# Patient Record
Sex: FEMALE | Race: OTHER | NOT HISPANIC OR LATINO
[De-identification: names, ages, dates, MRNs, and addresses within clinical notes are randomized per-mention and may not be internally consistent; named-entity substitution may affect disease eponyms.]

---

## 2019-05-31 ENCOUNTER — APPOINTMENT (OUTPATIENT)
Dept: PEDIATRIC ALLERGY IMMUNOLOGY | Facility: CLINIC | Age: 54
End: 2019-05-31

## 2019-07-31 ENCOUNTER — APPOINTMENT (OUTPATIENT)
Dept: PEDIATRIC ALLERGY IMMUNOLOGY | Facility: CLINIC | Age: 54
End: 2019-07-31
Payer: MEDICAID

## 2019-07-31 VITALS
DIASTOLIC BLOOD PRESSURE: 69 MMHG | SYSTOLIC BLOOD PRESSURE: 113 MMHG | OXYGEN SATURATION: 95 % | BODY MASS INDEX: 22.47 KG/M2 | HEART RATE: 105 BPM | HEIGHT: 63.9 IN | WEIGHT: 129.98 LBS

## 2019-07-31 DIAGNOSIS — L30.9 DERMATITIS, UNSPECIFIED: ICD-10-CM

## 2019-07-31 DIAGNOSIS — Z84.0 FAMILY HISTORY OF DISEASES OF THE SKIN AND SUBCUTANEOUS TISSUE: ICD-10-CM

## 2019-07-31 DIAGNOSIS — L29.9 PRURITUS, UNSPECIFIED: ICD-10-CM

## 2019-07-31 PROCEDURE — 99204 OFFICE O/P NEW MOD 45 MIN: CPT | Mod: 25

## 2019-07-31 PROCEDURE — 95004 PERQ TESTS W/ALRGNC XTRCS: CPT

## 2019-07-31 RX ORDER — TRIAMCINOLONE ACETONIDE 5 MG/G
0.5 OINTMENT TOPICAL 3 TIMES DAILY
Qty: 1 | Refills: 1 | Status: ACTIVE | COMMUNITY
Start: 2019-07-31 | End: 1900-01-01

## 2019-07-31 RX ORDER — CETIRIZINE HYDROCHLORIDE 10 MG/1
10 TABLET, FILM COATED ORAL
Qty: 30 | Refills: 1 | Status: ACTIVE | COMMUNITY
Start: 2019-07-31 | End: 1900-01-01

## 2019-07-31 NOTE — REASON FOR VISIT
[Red Eyes] : red eyes [Itchy Eyes] : itchy eyes [Initial Consultation] : an initial consultation for [Rash] : rash [Runny Nose] : runny nose [Congestion] : congestion

## 2019-07-31 NOTE — IMPRESSION
[Allergy Testing Dust Mite] : dust mites [________] : [unfilled] [Allergy Testing Mixed Feathers] : feathers [Allergy Testing Cockroach] : cockroach [Allergy Testing Dog] : dog [Allergy Testing Cat] : cat [] : molds [Allergy Testing Weeds] : weeds [Allergy Testing Grasses] : grasses

## 2019-08-03 PROBLEM — Z84.0 FAMILY HISTORY OF ATOPIC DERMATITIS: Status: ACTIVE | Noted: 2019-08-03

## 2019-08-03 PROBLEM — L30.9 ECZEMA: Status: ACTIVE | Noted: 2019-07-31

## 2019-08-03 PROBLEM — L29.9 PRURITUS: Status: ACTIVE | Noted: 2019-08-03

## 2019-08-03 NOTE — SOCIAL HISTORY
[Spouse/Partner] : spouse/partner [House] : [unfilled] lives in a house  [Window Units] : air conditioning provided by window units [Damp/Musty] : damp/musty [] :  [None] : none [de-identified] : son and daughter [Bedroom] : not in the bedroom [Living Area] : not in the living area [Basement] : not in the basement [Smokers in Household] : there are no smokers in the home

## 2019-08-03 NOTE — CONSULT LETTER
[Dear  ___] : Dear  [unfilled], [Please see my note below.] : Please see my note below. [Consult Letter:] : I had the pleasure of evaluating your patient, [unfilled]. [Sincerely,] : Sincerely, [Consult Closing:] : Thank you very much for allowing me to participate in the care of this patient.  If you have any questions, please do not hesitate to contact me. [FreeTextEntry2] : Jimbo Hartmann MD [FreeTextEntry3] : Tita Anderson MD, FAAAAI, FACJEREMII\par Associate , \par Assistant Fellowship Training ,\par Director, [facility]\par Division of Allergy and Immunology\par [facility]\par [facility]\par , Pediatrics and Medicine\par [facility]\par [address], [address]\[place] [address]\par [phone]\par

## 2019-08-03 NOTE — PHYSICAL EXAM
[Alert] : alert [Well Nourished] : well nourished [No Acute Distress] : no acute distress [Healthy Appearance] : healthy appearance [Normal Pupil & Iris Size/Symmetry] : normal pupil and iris size and symmetry [Well Developed] : well developed [Sclera Not Icteric] : sclera not icteric [No Photophobia] : no photophobia [No Discharge] : no discharge [Normal TMs] : both tympanic membranes were normal [Normal Lips/Tongue] : the lips and tongue were normal [Normal Outer Ear/Nose] : the ears and nose were normal in appearance [No Thrush] : no thrush [Normal Tonsils] : normal tonsils [Supple] : the neck was supple [Normal Rate and Effort] : normal respiratory rhythm and effort [No Crackles] : no crackles [No Retractions] : no retractions [Normal Rate] : heart rate was normal  [Normal S1, S2] : normal S1 and S2 [Bilateral Audible Breath Sounds] : bilateral audible breath sounds [Regular Rhythm] : with a regular rhythm [No murmur] : no murmur [Soft] : abdomen soft [Normal Cervical Lymph Nodes] : cervical [Not Distended] : not distended [No clubbing] : no clubbing [Eczematous Patches] : eczematous patches present [No Edema] : no edema [No Cyanosis] : no cyanosis [Normal Mood] : mood was normal [Normal Affect] : affect was normal [Alert, Awake, Oriented as Age-Appropriate] : alert, awake, oriented as age appropriate [Conjunctival Erythema] : no conjunctival erythema [Boggy Nasal Turbinates] : boggy and/or pale nasal turbinates [Exudate] : no exudate [Pharyngeal erythema] : no pharyngeal erythema [Posterior Pharyngeal Cobblestoning] : no posterior pharyngeal cobblestoning [Clear Rhinorrhea] : no clear rhinorrhea was seen [de-identified] : dry skin with abrasions noted on b/l antecubital fossa and back of the neck; eczematous patches on antecubital fossae bilaterally; back of neck and generalized dry skin

## 2019-08-03 NOTE — ASSESSMENT
[FreeTextEntry1] : 53 year old female with \par \par 1) ECZEMA/ATOPIC DERMATITIS: Triamcinolone ointment 2-3 times a day to affected areas. Zyrtec QD at bedtime for itching. Discussed the importance of moisturization, use of unscented products, and appropriate bathing methods.\par taking short lukewarm showers. If no improvement in 1 month.\par \par PossIble CONTACT DERMATITIS: patient should return to get patch testing performed on a Monday.\par \par 2) DUST MITE AND POLLEN ALLERGY- Allergic Rhinitis (environmental allergies to dustmites and birch): Discussed instructions for allergen avoidance. Can use flonase daily in spring if needed, in addition to avoidance of triggers.

## 2019-08-03 NOTE — HISTORY OF PRESENT ILLNESS
[Asthma] : asthma [Venom Reactions] : venom reactions [Food Allergies] : food allergies [de-identified] : 53 year old Female with no PMH who is here for initial consultation for rashes x 6 months. Patient states the rashes started as itchy bumps, once she started to scratch them, they developed into open lesions or abrasions. She has tried to put lotion on it (Kristi), which did not help. PCP gave some cream, thinks it is hydrocortisone 0.05% but unclear; states it did not help. She used it for 1 month. Ms. Mooney had menopause 5 years ago. On ROS, intermittent itchy eyes, runny nose, congestion but much improved from earlier years.

## 2019-08-03 NOTE — REVIEW OF SYSTEMS
[Pruritis] : pruritis [Dry Skin] : ~L dry skin [Nl] : Endocrine [Eye Itching] : itchy eyes [Rhinorrhea] : rhinorrhea [Nasal Congestion] : nasal congestion [Puffy Eyelids] : no puffy ~T eyelids [Nosebleeds] : no epistaxis [Bloodshot Eyes] : no bloodshot ~T eyes [Snoring] : no snoring [Nasal Itching] : no nasal itching [Mouth Sores] : no mouth sores [Sore Throat] : no sore throat [Throat Itching] : no throat itching [Post Nasal Drip] : no post nasal drip [Sneezing] : no sneezing [Urticaria] : no urticaria [Swelling] : no swelling [de-identified] : rash

## 2019-10-28 ENCOUNTER — EMERGENCY (EMERGENCY)
Facility: HOSPITAL | Age: 54
LOS: 1 days | Discharge: HOME | End: 2019-10-28
Attending: EMERGENCY MEDICINE | Admitting: EMERGENCY MEDICINE
Payer: MEDICAID

## 2019-10-28 VITALS
DIASTOLIC BLOOD PRESSURE: 82 MMHG | SYSTOLIC BLOOD PRESSURE: 143 MMHG | OXYGEN SATURATION: 96 % | HEART RATE: 130 BPM | TEMPERATURE: 98 F | RESPIRATION RATE: 25 BRPM

## 2019-10-28 LAB
ALBUMIN SERPL ELPH-MCNC: 4.3 G/DL — SIGNIFICANT CHANGE UP (ref 3.3–5)
ALP SERPL-CCNC: 79 U/L — SIGNIFICANT CHANGE UP (ref 40–120)
ALT FLD-CCNC: 17 U/L — SIGNIFICANT CHANGE UP (ref 4–33)
ANION GAP SERPL CALC-SCNC: 12 MMO/L — SIGNIFICANT CHANGE UP (ref 7–14)
APTT BLD: 34.2 SEC — SIGNIFICANT CHANGE UP (ref 27.5–36.3)
AST SERPL-CCNC: 21 U/L — SIGNIFICANT CHANGE UP (ref 4–32)
BASE EXCESS BLDV CALC-SCNC: 1.3 MMOL/L — SIGNIFICANT CHANGE UP
BASOPHILS # BLD AUTO: 0.1 K/UL — SIGNIFICANT CHANGE UP (ref 0–0.2)
BASOPHILS NFR BLD AUTO: 0.8 % — SIGNIFICANT CHANGE UP (ref 0–2)
BILIRUB SERPL-MCNC: 0.3 MG/DL — SIGNIFICANT CHANGE UP (ref 0.2–1.2)
BLOOD GAS VENOUS - CREATININE: 0.82 MG/DL — SIGNIFICANT CHANGE UP (ref 0.5–1.3)
BUN SERPL-MCNC: 14 MG/DL — SIGNIFICANT CHANGE UP (ref 7–23)
CALCIUM SERPL-MCNC: 9.6 MG/DL — SIGNIFICANT CHANGE UP (ref 8.4–10.5)
CHLORIDE BLDV-SCNC: 105 MMOL/L — SIGNIFICANT CHANGE UP (ref 96–108)
CHLORIDE SERPL-SCNC: 103 MMOL/L — SIGNIFICANT CHANGE UP (ref 98–107)
CO2 SERPL-SCNC: 25 MMOL/L — SIGNIFICANT CHANGE UP (ref 22–31)
CREAT SERPL-MCNC: 0.86 MG/DL — SIGNIFICANT CHANGE UP (ref 0.5–1.3)
EOSINOPHIL # BLD AUTO: 0.98 K/UL — HIGH (ref 0–0.5)
EOSINOPHIL NFR BLD AUTO: 8.2 % — HIGH (ref 0–6)
GAS PNL BLDV: 141 MMOL/L — SIGNIFICANT CHANGE UP (ref 136–146)
GLUCOSE BLDV-MCNC: 93 MG/DL — SIGNIFICANT CHANGE UP (ref 70–99)
GLUCOSE SERPL-MCNC: 95 MG/DL — SIGNIFICANT CHANGE UP (ref 70–99)
HCO3 BLDV-SCNC: 25 MMOL/L — SIGNIFICANT CHANGE UP (ref 20–27)
HCT VFR BLD CALC: 40.5 % — SIGNIFICANT CHANGE UP (ref 34.5–45)
HCT VFR BLDV CALC: 42.5 % — SIGNIFICANT CHANGE UP (ref 34.5–45)
HGB BLD-MCNC: 13.3 G/DL — SIGNIFICANT CHANGE UP (ref 11.5–15.5)
HGB BLDV-MCNC: 13.8 G/DL — SIGNIFICANT CHANGE UP (ref 11.5–15.5)
IMM GRANULOCYTES NFR BLD AUTO: 0.3 % — SIGNIFICANT CHANGE UP (ref 0–1.5)
INR BLD: 0.99 — SIGNIFICANT CHANGE UP (ref 0.88–1.17)
LACTATE BLDV-MCNC: 1.1 MMOL/L — SIGNIFICANT CHANGE UP (ref 0.5–2)
LYMPHOCYTES # BLD AUTO: 28.1 % — SIGNIFICANT CHANGE UP (ref 13–44)
LYMPHOCYTES # BLD AUTO: 3.36 K/UL — HIGH (ref 1–3.3)
MCHC RBC-ENTMCNC: 25.7 PG — LOW (ref 27–34)
MCHC RBC-ENTMCNC: 32.8 % — SIGNIFICANT CHANGE UP (ref 32–36)
MCV RBC AUTO: 78.2 FL — LOW (ref 80–100)
MONOCYTES # BLD AUTO: 0.63 K/UL — SIGNIFICANT CHANGE UP (ref 0–0.9)
MONOCYTES NFR BLD AUTO: 5.3 % — SIGNIFICANT CHANGE UP (ref 2–14)
NEUTROPHILS # BLD AUTO: 6.84 K/UL — SIGNIFICANT CHANGE UP (ref 1.8–7.4)
NEUTROPHILS NFR BLD AUTO: 57.3 % — SIGNIFICANT CHANGE UP (ref 43–77)
NRBC # FLD: 0 K/UL — SIGNIFICANT CHANGE UP (ref 0–0)
PCO2 BLDV: 44 MMHG — SIGNIFICANT CHANGE UP (ref 41–51)
PH BLDV: 7.39 PH — SIGNIFICANT CHANGE UP (ref 7.32–7.43)
PLATELET # BLD AUTO: 420 K/UL — HIGH (ref 150–400)
PMV BLD: 8.9 FL — SIGNIFICANT CHANGE UP (ref 7–13)
PO2 BLDV: 37 MMHG — SIGNIFICANT CHANGE UP (ref 35–40)
POTASSIUM BLDV-SCNC: 3.7 MMOL/L — SIGNIFICANT CHANGE UP (ref 3.4–4.5)
POTASSIUM SERPL-MCNC: 3.9 MMOL/L — SIGNIFICANT CHANGE UP (ref 3.5–5.3)
POTASSIUM SERPL-SCNC: 3.9 MMOL/L — SIGNIFICANT CHANGE UP (ref 3.5–5.3)
PROT SERPL-MCNC: 8.4 G/DL — HIGH (ref 6–8.3)
PROTHROM AB SERPL-ACNC: 11.3 SEC — SIGNIFICANT CHANGE UP (ref 9.8–13.1)
RBC # BLD: 5.18 M/UL — SIGNIFICANT CHANGE UP (ref 3.8–5.2)
RBC # FLD: 13.8 % — SIGNIFICANT CHANGE UP (ref 10.3–14.5)
SAO2 % BLDV: 64.6 % — SIGNIFICANT CHANGE UP (ref 60–85)
SODIUM SERPL-SCNC: 140 MMOL/L — SIGNIFICANT CHANGE UP (ref 135–145)
TROPONIN T, HIGH SENSITIVITY: < 6 NG/L — SIGNIFICANT CHANGE UP (ref ?–14)
WBC # BLD: 11.94 K/UL — HIGH (ref 3.8–10.5)
WBC # FLD AUTO: 11.94 K/UL — HIGH (ref 3.8–10.5)

## 2019-10-28 PROCEDURE — 99284 EMERGENCY DEPT VISIT MOD MDM: CPT

## 2019-10-28 PROCEDURE — 71046 X-RAY EXAM CHEST 2 VIEWS: CPT | Mod: 26

## 2019-10-28 NOTE — ED ADULT TRIAGE NOTE - AS O2 DELIVERY
room air
Static: mental illness, prior hospitalizations  Modifiable: substance use  Protective factors: able to navigate community and assess help in need of crisis
Static: mental illness, prior hospitalizations  Modifiable: substance use  Protective factors: able to navigate community and assess help in need of crisis

## 2019-10-28 NOTE — ED PROVIDER NOTE - OBJECTIVE STATEMENT
52 yo F PMHx anxiety presenting with shortness of breath and tachycardia. 52 yo F PMHx anxiety presenting with shortness of breath and tachycardia.  Pt endorses SOB over past 2 weeks.  Breathing worse with exertion.  Endorses cough and sore throat.  Denies fever/chills, cp, n/v/d, sick contacts, recent travel.

## 2019-10-28 NOTE — ED ADULT NURSE NOTE - NSIMPLEMENTINTERV_GEN_ALL_ED
Implemented All Universal Safety Interventions:  [employer] to call system. Call bell, personal items and telephone within reach. Instruct patient to call for assistance. Room bathroom lighting operational. Non-slip footwear when patient is off stretcher. Physically safe environment: no spills, clutter or unnecessary equipment. Stretcher in lowest position, wheels locked, appropriate side rails in place.

## 2019-10-28 NOTE — ED ADULT NURSE NOTE - OBJECTIVE STATEMENT
Pt rcvd to rm 7 by Float RN - c/o shortness of breath, dyspnea on exertion x2 wks, worsening today and not alleviated by rest.  Pt states no recent travel, sick contacts, fevers/chills, chest pain, abd pain, n/v/d.  Pt denies significant PMHx, no daily medications, denies hx cardiac problems for her or family.  Pt HR STachy 100s on CM, BP stable, Respirations tachypneic to 20rr on RA, o2sat maintained 100%.  Pt endorses tearing of eyes, environmental allergies, lung sounds clear to auscultation bilaterally w/ mild rhonchi to RLL. Pt noted to have dermatitis raised rash to bilat inner elbows - states has been ongoing for 2mo and has seen outpatient PCP for w/o diagnosis as of yet, but not spreading or worsening in condition.  IV placed to R AC #20g, labs drawn/sent as ordered, RVP specimen drawn/sent, pt and family advised to ISO precautions at this time.  Pt advised plan of care, pending ED MD evaluation and further plan of care, safety measures in place, handoff rpt to primary RN given.

## 2019-10-28 NOTE — ED PROVIDER NOTE - PROGRESS NOTE DETAILS
53F tahycardia.  SOB x 2 weeks some cough sore throat.  CXR clear  if dimer neg cancel cta.  some rales bilat.  if all neg eval for acs.  EKG Stach ST dep infr. Recheck EKG - improved.  trop neg x 2.  Pt still feeling some SOB, has inspiratory crackles and CTA no EP but concern for bronchitis.  Offered CDU, pt agreed.  Plan - fluids, nebs q4-q6.  Echo, cards eval in AM given abnl EKG.  on exam pt with cough, insp crackles, dry MM, diffuse excoriations.

## 2019-10-29 VITALS
RESPIRATION RATE: 18 BRPM | OXYGEN SATURATION: 98 % | DIASTOLIC BLOOD PRESSURE: 68 MMHG | SYSTOLIC BLOOD PRESSURE: 125 MMHG | HEART RATE: 112 BPM

## 2019-10-29 LAB
B PERT DNA SPEC QL NAA+PROBE: NOT DETECTED — SIGNIFICANT CHANGE UP
C PNEUM DNA SPEC QL NAA+PROBE: NOT DETECTED — SIGNIFICANT CHANGE UP
CK MB BLD-MCNC: < 1 NG/ML — LOW (ref 1–4.7)
CK SERPL-CCNC: 58 U/L — SIGNIFICANT CHANGE UP (ref 25–170)
D DIMER BLD IA.RAPID-MCNC: 296 NG/ML — SIGNIFICANT CHANGE UP
FLUAV H1 2009 PAND RNA SPEC QL NAA+PROBE: NOT DETECTED — SIGNIFICANT CHANGE UP
FLUAV H1 RNA SPEC QL NAA+PROBE: NOT DETECTED — SIGNIFICANT CHANGE UP
FLUAV H3 RNA SPEC QL NAA+PROBE: NOT DETECTED — SIGNIFICANT CHANGE UP
FLUAV SUBTYP SPEC NAA+PROBE: NOT DETECTED — SIGNIFICANT CHANGE UP
FLUBV RNA SPEC QL NAA+PROBE: NOT DETECTED — SIGNIFICANT CHANGE UP
HADV DNA SPEC QL NAA+PROBE: NOT DETECTED — SIGNIFICANT CHANGE UP
HBA1C BLD-MCNC: 5.6 % — SIGNIFICANT CHANGE UP (ref 4–5.6)
HCOV PNL SPEC NAA+PROBE: SIGNIFICANT CHANGE UP
HMPV RNA SPEC QL NAA+PROBE: NOT DETECTED — SIGNIFICANT CHANGE UP
HPIV1 RNA SPEC QL NAA+PROBE: NOT DETECTED — SIGNIFICANT CHANGE UP
HPIV2 RNA SPEC QL NAA+PROBE: NOT DETECTED — SIGNIFICANT CHANGE UP
HPIV3 RNA SPEC QL NAA+PROBE: NOT DETECTED — SIGNIFICANT CHANGE UP
HPIV4 RNA SPEC QL NAA+PROBE: NOT DETECTED — SIGNIFICANT CHANGE UP
NT-PROBNP SERPL-SCNC: 17.6 PG/ML — SIGNIFICANT CHANGE UP
RSV RNA SPEC QL NAA+PROBE: NOT DETECTED — SIGNIFICANT CHANGE UP
RV+EV RNA SPEC QL NAA+PROBE: NOT DETECTED — SIGNIFICANT CHANGE UP
TROPONIN T, HIGH SENSITIVITY: < 6 NG/L — SIGNIFICANT CHANGE UP (ref ?–14)

## 2019-10-29 PROCEDURE — 93306 TTE W/DOPPLER COMPLETE: CPT | Mod: 26

## 2019-10-29 PROCEDURE — 71275 CT ANGIOGRAPHY CHEST: CPT | Mod: 26

## 2019-10-29 PROCEDURE — 99234 HOSP IP/OBS SM DT SF/LOW 45: CPT

## 2019-10-29 RX ORDER — SODIUM CHLORIDE 9 MG/ML
1000 INJECTION, SOLUTION INTRAVENOUS ONCE
Refills: 0 | Status: COMPLETED | OUTPATIENT
Start: 2019-10-29 | End: 2019-10-29

## 2019-10-29 RX ORDER — ALBUTEROL 90 UG/1
2 AEROSOL, METERED ORAL
Qty: 1 | Refills: 0
Start: 2019-10-29

## 2019-10-29 RX ORDER — IPRATROPIUM/ALBUTEROL SULFATE 18-103MCG
3 AEROSOL WITH ADAPTER (GRAM) INHALATION ONCE
Refills: 0 | Status: COMPLETED | OUTPATIENT
Start: 2019-10-29 | End: 2019-10-29

## 2019-10-29 RX ORDER — IPRATROPIUM/ALBUTEROL SULFATE 18-103MCG
3 AEROSOL WITH ADAPTER (GRAM) INHALATION EVERY 4 HOURS
Refills: 0 | Status: DISCONTINUED | OUTPATIENT
Start: 2019-10-29 | End: 2019-11-04

## 2019-10-29 RX ORDER — ASPIRIN/CALCIUM CARB/MAGNESIUM 324 MG
325 TABLET ORAL ONCE
Refills: 0 | Status: COMPLETED | OUTPATIENT
Start: 2019-10-29 | End: 2019-10-29

## 2019-10-29 RX ORDER — FLUTICASONE PROPIONATE 50 MCG
1 SPRAY, SUSPENSION NASAL
Qty: 1 | Refills: 0
Start: 2019-10-29 | End: 2019-11-11

## 2019-10-29 RX ADMIN — SODIUM CHLORIDE 1000 MILLILITER(S): 9 INJECTION, SOLUTION INTRAVENOUS at 05:18

## 2019-10-29 RX ADMIN — Medication 3 MILLILITER(S): at 10:19

## 2019-10-29 RX ADMIN — Medication 3 MILLILITER(S): at 06:14

## 2019-10-29 RX ADMIN — Medication 325 MILLIGRAM(S): at 01:57

## 2019-10-29 NOTE — ED CDU PROVIDER DISPOSITION NOTE - ATTENDING CONTRIBUTION TO CARE
I, Jennifer Cabot, MD, have performed a history and physical exam of the patient and discussed their management with the ACP.  I reviewed the PA's note and agree with the documented findings and plan of care. My medical decision making and observations are found above.    Cabot: 53F with PMH of anxiety, here with 2 weeks of cough, sore throat, SOB, palps.  EKG with minor STDs that resolved while in ED.  Trop < 6 x 2, dimer elevated, CTA neg for PE and positive for bronchitis, RVP neg, BNP not elevated.  Admitted to CDU for monitoring and ECHO.  On exam, HDS, NAD, MMM, oropharynx without lesions, uvula midline, eyes clear, lungs CTAB, heart sounds normal, abd soft, NT, ND, no CVAT, LEs without edema, wwp, skin normal temperature and color, neuro: alert and oriented, no focal deficits, radial pulses 2+ bilat.  Likely URI.  WIll follow up ECHO. I, Jennifer Cabot, MD, have performed a history and physical exam of the patient and discussed their management with the ACP.  I reviewed the PA's note and agree with the documented findings and plan of care. My medical decision making and observations are found above.    Cabot: 53F with PMH of anxiety, here with 2 weeks of cough, sore throat, SOB, palps.  EKG with minor STDs that resolved while in ED.  Trop < 6 x 2, dimer elevated, CTA neg for PE and positive for bronchitis, RVP neg, BNP not elevated.  Admitted to CDU for monitoring and ECHO.  ECHO was performed and is normal.  On exam, mildly tachy after nebs but normotensive, NAD, MMM, oropharynx without lesions, uvula midline, eyes clear, lungs CTAB, heart sounds normal, abd soft, NT, ND, no CVAT, LEs without edema, wwp, skin normal temperature and color, neuro: alert and oriented, no focal deficits, radial pulses 2+ bilat.  Likely URI.

## 2019-10-29 NOTE — ED CDU PROVIDER INITIAL DAY NOTE - MEDICAL DECISION MAKING DETAILS
sob, dry cough, cta wnl, troponin wnl, possible st depressions in 2-3, in cdu for tele, echo, nebs, reasses

## 2019-10-29 NOTE — ED CDU PROVIDER INITIAL DAY NOTE - OBJECTIVE STATEMENT
54 yo F PMHx anxiety ( not on any meds)  presenting with shortness of breath and tachycardia.  Pt endorses SOB over past 2 weeks.  Breathing worse with exertion, symptoms worsening x last 2 days which prompted her to come to the ER. Pt also endorses dry cough.  Denies any headaches, neck pain, visual disturbances, f/c/n/v/d, chest pain, abdominal pain, urinary symptoms, numbness/weakness/tingling, leg swelling, recent travel, sick conact, social history  IN the ER, troponin wnl, d-dimer pos, cta shows no Pe's, original ekg showed possibly ST depression in lead 2-3, sent to cdu for tele, echo, nebs, reasses 54 yo F PMHx anxiety (not on any meds)  presenting with shortness of breath and tachycardia.  Pt endorses SOB over past 2 weeks.  Breathing worse with exertion, symptoms worsening x last 2 days which prompted her to come to the ER. Pt also endorses dry cough.  Denies any headaches, neck pain, visual disturbances, f/c/n/v/d, chest pain, abdominal pain, urinary symptoms, numbness/weakness/tingling, leg swelling, recent travel, sick conact, social history  IN the ER, troponin wnl, d-dimer pos, cta shows no Pe's, original ekg showed possibly ST depression in lead 2-3, sent to cdu for tele, echo, nebs, reasses

## 2019-10-29 NOTE — ED CDU PROVIDER DISPOSITION NOTE - CLINICAL COURSE
Cabot: 53F with PMH of anxiety, here with 2 weeks of cough, sore throat, SOB, palps.  EKG with minor STDs that resolved while in ED.  Trop < 6 x 2, dimer elevated, CTA neg for PE and positive for bronchitis, RVP neg, BNP not elevated.  Admitted to CDU for monitoring and ECHO.  On exam, HDS, NAD, MMM, oropharynx without lesions, uvula midline, eyes clear, lungs CTAB, heart sounds normal, abd soft, NT, ND, no CVAT, LEs without edema, wwp, skin normal temperature and color, neuro: alert and oriented, no focal deficits, radial pulses 2+ bilat.  Likely URI.  WIll follow up ECHO. Cabot: 53F with PMH of anxiety, here with 2 weeks of cough, sore throat, SOB, palps.  EKG with minor STDs that resolved while in ED.  Trop < 6 x 2, dimer elevated, CTA neg for PE and positive for bronchitis, RVP neg, BNP not elevated.  Admitted to CDU for monitoring and ECHO.  ECHO was performed and is normal.  On exam, mildly tachy after nebs but normotensive, NAD, MMM, oropharynx without lesions, uvula midline, eyes clear, lungs CTAB, heart sounds normal, abd soft, NT, ND, no CVAT, LEs without edema, wwp, skin normal temperature and color, neuro: alert and oriented, no focal deficits, radial pulses 2+ bilat.  Likely URI.

## 2019-10-29 NOTE — ED CDU PROVIDER DISPOSITION NOTE - NSFOLLOWUPINSTRUCTIONS_ED_ALL_ED_FT
Advance activity as tolerated.  Continue all previously prescribed medications as directed unless otherwise instructed.  Take Tessalon Perles 100 mg three times a day as needed for cough -- causes drowsiness, no drinking alcohol or driving with this medication.  Take Guiafenesin 600 mg twice a day as needed for chest congestion.  Use Albuterol Inhaler 2 puffs every 4 to 6 hours as needed for shortness of breath and/or wheezing.  Follow up with your primary care physician in 48-72 hours- bring copies of your results.  Return to the ER for worsening or persistent symptoms, including but not limited to worsening/persistent shortness of breath, lightheadedness, fevers, chest pain, passing out, headaches, neck pain or stiffness, and/or ANY NEW OR CONCERNING SYMPTOMS. If you have issues obtaining follow up, please call: [phone] to obtain a doctor or specialist who takes your insurance in your area.  You may call [phone] to make an appointment with the internal medicine clinic.

## 2019-10-29 NOTE — ED CDU PROVIDER DISPOSITION NOTE - PATIENT PORTAL LINK FT
You can access the FollowMyHealth Patient Portal offered by [facility] by registering at the following website: [email or web address]. By joining [employer]’s FollowMyHealth portal, you will also be able to view your health information using other applications (apps) compatible with our system.

## 2019-10-29 NOTE — ED CDU PROVIDER INITIAL DAY NOTE - PROGRESS NOTE DETAILS
Pt notes feeling better.  She notes SOB improved and resolves when she blows her nose.  She only complains of occasional cough and nasal congestion at this time.  Ambulatory pulse ox (ambulated for 5 minutes around CDU on continuous pulse ox on room air) 96-99%.  Echo negative for acute pathology.  Pt medically stable for discharge.  Pt to follow up with PMD.  Strict return precautions given.  Reassessment performed and plan for discharge discussed with Dr. Cabot who agrees with disposition and discharge plan. Pt notes feeling better.  She notes SOB improved and resolves when she blows her nose.  She only complains of occasional cough and nasal congestion at this time.  Ambulatory pulse ox (ambulated for 5 minutes around CDU on continuous pulse ox on room air) 96-99%.  Echo negative for acute pathology.  Pt medically stable for discharge.  Pt to follow up with PMD.  Strict return precautions given.  HR in 110s likely 2/2 recent beta agonist treatment; denies any chest pain, palpitations, lightheadedness.  Reassessment performed and plan for discharge discussed with Dr. Cabot who agrees with disposition and discharge plan.

## 2019-10-29 NOTE — ED CDU PROVIDER INITIAL DAY NOTE - ATTENDING CONTRIBUTION TO CARE
I, Jennifer Cabot, MD, have performed a history and physical exam of the patient and discussed their management with the ACP.  I reviewed the PA's note and agree with the documented findings and plan of care. My medical decision making and observations are found above.    Cabot: 53F with PMH of anxiety, here with 2 weeks of cough, sore throat, SOB, palps.  EKG with minor STDs that resolved while in ED.  Trop < 6 x 2, dimer elevated, CTA neg for PE and positive for bronchitis, RVP neg, BNP not elevated.  Admitted to CDU for monitoring and ECHO.  On exam, HDS, NAD, MMM, oropharynx without lesions, uvula midline, eyes clear, lungs CTAB, heart sounds normal, abd soft, NT, ND, no CVAT, LEs without edema, wwp, skin normal temperature and color, neuro: alert and oriented, no focal deficits, radial pulses 2+ bilat.  Likely URI.  WIll follow up ECHO.

## 2021-09-08 ENCOUNTER — EMERGENCY (EMERGENCY)
Facility: HOSPITAL | Age: 56
LOS: 1 days | Discharge: HOME | End: 2021-09-08
Admitting: STUDENT IN AN ORGANIZED HEALTH CARE EDUCATION/TRAINING PROGRAM
Payer: MEDICAID

## 2021-09-08 VITALS
HEIGHT: 64 IN | DIASTOLIC BLOOD PRESSURE: 78 MMHG | TEMPERATURE: 98 F | SYSTOLIC BLOOD PRESSURE: 133 MMHG | HEART RATE: 79 BPM | RESPIRATION RATE: 16 BRPM | OXYGEN SATURATION: 100 %

## 2021-09-08 PROCEDURE — 99284 EMERGENCY DEPT VISIT MOD MDM: CPT

## 2021-09-08 RX ORDER — CYCLOBENZAPRINE HYDROCHLORIDE 10 MG/1
1 TABLET, FILM COATED ORAL
Qty: 15 | Refills: 0
Start: 2021-09-08 | End: 2021-09-12

## 2021-09-08 RX ORDER — DICLOFENAC SODIUM 75 MG/1
1 TABLET, DELAYED RELEASE ORAL
Qty: 10 | Refills: 0
Start: 2021-09-08 | End: 2021-09-12

## 2021-09-08 RX ORDER — IBUPROFEN 200 MG
800 TABLET ORAL ONCE
Refills: 0 | Status: COMPLETED | OUTPATIENT
Start: 2021-09-08 | End: 2021-09-08

## 2021-09-08 RX ORDER — LIDOCAINE 4 G/100G
1 CREAM TOPICAL ONCE
Refills: 0 | Status: COMPLETED | OUTPATIENT
Start: 2021-09-08 | End: 2021-09-08

## 2021-09-08 RX ORDER — OXYCODONE AND ACETAMINOPHEN 5; 325 MG/1; MG/1
1 TABLET ORAL ONCE
Refills: 0 | Status: DISCONTINUED | OUTPATIENT
Start: 2021-09-08 | End: 2021-09-08

## 2021-09-08 RX ADMIN — LIDOCAINE 1 PATCH: 4 CREAM TOPICAL at 14:57

## 2021-09-08 RX ADMIN — Medication 800 MILLIGRAM(S): at 14:56

## 2021-09-08 RX ADMIN — OXYCODONE AND ACETAMINOPHEN 1 TABLET(S): 5; 325 TABLET ORAL at 14:57

## 2021-09-08 NOTE — ED PROVIDER NOTE - PATIENT PORTAL LINK FT
You can access the FollowMyHealth Patient Portal offered by [facility] by registering at the following website: [email or web address]. By joining [employer]’s FollowMyHealth portal, you will also be able to view your health information using other applications (apps) compatible with our system.

## 2021-09-08 NOTE — ED PROVIDER NOTE - NSFOLLOWUPINSTRUCTIONS_ED_ALL_ED_FT
[facility]  Orthopedic Surgery  [address] [address]  Phone: [phone]  Fax:   Follow Up Time:     [facility]  Orthopedic Surgery  [address], 3rd & 4th [place] [address]  Phone: [phone]  Fax:   Follow Up Time:     [facility] [place]  Orthopedics  .  [place]   Phone: [phone]  Fax:   Follow Up Time:     [facility]  Orthopedics  [address] [place] [address]  Phone: [phone]  Fax: [phone]  Follow Up Time:     [facility]  Orthopedics  [address] [place] [address]  Phone: [phone]  Fax:   Follow Up Time:     Back Pain  WHAT YOU NEED TO KNOW:  Back pain is common. It can be caused by many conditions, such as arthritis or the breakdown of spinal discs. Your risk for back pain is increased by injuries, lack of activity, or repeated bending and twisting. You may feel sore or stiff on one or both sides of your back. The pain may spread to your buttocks or thighs.  DISCHARGE INSTRUCTIONS:  Return to the emergency department if:   •You have pain, numbness, or weakness in one or both legs.  •Your pain becomes so severe that you cannot walk.  •You cannot control your urine or bowel movements.  •You have severe back pain with chest pain.  •You have severe back pain, nausea, and vomiting.  •You have severe back pain that spreads to your side or genital area.  Contact your healthcare provider if:   •You have back pain that does not get better with rest and pain medicine.  •You have a fever.  •You have pain that worsens when you are on your back or when you rest.  •You have pain that worsens when you cough or sneeze.  •You lose weight without trying.  •You have questions or concerns about your condition or care.  Medicines:   •NSAIDs help decrease swelling and pain. This medicine is available with or without a doctor's order. NSAIDs can cause stomach bleeding or kidney problems in certain people. If you take blood thinner medicine, always ask your healthcare provider if NSAIDs are safe for you. Always read the medicine label and follow directions.  •Acetaminophen decreases pain and fever. It is available without a doctor's order. Ask how much to take and how often to take it. Follow directions. Read the labels of all other medicines you are using to see if they also contain acetaminophen, or ask your doctor or pharmacist. Acetaminophen can cause liver damage if not taken correctly. Do not use more than 4 grams (4,000 milligrams) total of acetaminophen in one day.   •Muscle relaxers help decrease muscle spasms and back pain.  •Prescription pain medicine may be given. Ask your healthcare provider how to take this medicine safely. Some prescription pain medicines contain acetaminophen. Do not take other medicines that contain acetaminophen without talking to your healthcare provider. Too much acetaminophen may cause liver damage. Prescription pain medicine may cause constipation. Ask your healthcare provider how to prevent or treat constipation.   •Take your medicine as directed. Contact your healthcare provider if you think your medicine is not helping or if you have side effects. Tell him or her if you are allergic to any medicine. Keep a list of the medicines, vitamins, and herbs you take. Include the amounts, and when and why you take them. Bring the list or the pill bottles to follow-up visits. Carry your medicine list with you in case of an emergency.  How to manage your back pain:   •Apply ice on your back for 15 to 20 minutes every hour or as directed. Use an ice pack, or put crushed ice in a plastic bag. Cover it with a towel before you apply it to your skin. Ice helps prevent tissue damage and decreases pain.  •Apply heat on your back for 20 to 30 minutes every 2 hours for as many days as directed. Heat helps decrease pain and muscle spasms.  •Stay active as much as you can without causing more pain. Bed rest could make your back pain worse. Avoid heavy lifting until your pain is gone.  •Go to physical therapy as directed. A physical therapist can teach you exercises to help improve movement and strength, and to decrease pain.  Follow up with your healthcare provider in 2 weeks, or as directed: Write down your questions so you remember to ask them during your visits.    Dolor de espalda  LO QUE NECESITA SABER:  El dolor de espalda es común. Puede ser causado por min gran cantidad de afecciones, yo la artritis o por el deterioro de los discos de la columna vertebral. El riesgo del dolor de espalda aumenta al sufrir lesiones, por falta de actividad física, o inclinarse hacia adelante o girar de un lado a otro de forma repetitiva. Usted puede estar adolorido o sentir rigidez en carolina o ambos lados de la espalda. El dolor se puede propagar a aman glúteos o muslos.  INSTRUCCIONES SOBRE EL EKATERINA HOSPITALARIA:  Regrese a la rahat de emergencias si:  •Usted tiene dolor, entumecimiento o debilidad en min o en ambas piernas.  •El dolor se vuelve tan intenso que lo incapacita para caminar.  •Usted no puede controlar mak orina ni aman deposiciones intestinales.  •Usted tiene dolor de espalda intenso con dolor en el pecho.  •Usted tiene dolor de espalda intenso, náuseas y vómito.  •Usted tiene un dolor de espalda intenso que se propaga a un costado o al área genital.  Comuníquese con mak médico si:  •Usted tiene dolor de espalda que no mejora con el reposo, ni con el medicamento para el dolor.  •Tiene fiebre.  •Usted tiene un dolor que empeora cuando está acostado boca arriba o al descansar.  •Usted tiene dolor que empeora cuando tose o estornuda.  •Usted pierde peso sin proponérselo.  •Usted tiene preguntas o inquietudes acerca de mak condición o cuidado.  Medicamentos:  •Los WANDA,ayudan a disminuir la inflamación y el dolor. Rommel medicamento está disponible con o sin min receta médica. Los WANDA pueden causar sangrado estomacal o problemas renales en ciertas personas. Si usted felipe un medicamento anticoagulante, siempre pregúntele a mak médico si los WANDA son seguros para usted. Siempre andressa la etiqueta de rommel medicamento y siga las instrucciones.  •Acetaminofénalivia el dolor y baja la fiebre. Está disponible sin receta médica. Pregunte la cantidad y la frecuencia con que debe tomarlos. Siga las indicaciones. Andressa las etiquetas de todos los demás medicamentos que esté usando para saber si también contienen acetaminofén, o pregunte a mak médico o farmacéutico. El acetaminofén puede causar daño en el hígado cuando no se felipe de forma correcta. No use más de 4 gramos (4000 miligramos) en total de acetaminofeno en un día.  •Relajantes muscularesayudan a disminuir los espasmos musculares y el dolor de espalda.  •Puede administrarsepodrían administrarse. Pregunte al médico cómo debe ailyn rommel medicamento de forma marie. Algunos medicamentos recetados para el dolor contienen acetaminofén. No tome otros medicamentos que contengan acetaminofén sin consultarlo con mak médico. Demasiado acetaminofeno puede causar daño al hígado. Los medicamentos recetados para el dolor podrían causar estreñimiento. Pregunte a mak médico yo prevenir o tratar estreñimiento.  •[place] aman medicamentos yo se le haya indicado.Consulte con mak médico si usted andrés que mak medicamento no le está ayudando o si presenta efectos secundarios. Infórmele si es alérgico a cualquier medicamento. Mantenga min lista actualizada de los medicamentos, las vitaminas y los productos herbales que felipe. Incluya los siguientes datos de los medicamentos: cantidad, frecuencia y motivo de administración. Traiga con usted la lista o los envases de las píldoras a aman citas de seguimiento. Lleve la lista de los medicamentos con usted en khoa de min emergencia.  La forma de controlar mak dolor de espalda:  •Aplique hieloen la espalda de 15 a 20 minutos cada hora o yo se le indique. Use min compresa de hielo o ponga hielo triturado en min bolsa de plástico. Cúbralo con min toalla antes de aplicarlo sobre mak piel. El hielo disminuye el dolor y ayuda a evitar el daño en los tejidos.  •La aplicación de caloren la espalda de 20 a 30 minutos cada 2 horas por los días que le indiquen. El calor ayuda a disminuir el dolor y los espasmos musculares.  •Manténgase activolo más que pueda sin causar más dolor. El reposo en cama puede empeorar mak dolor de espalda. Evite levantar objetos hasta que ya no tenga dolor.  •Vaya a terapia física según indicaciones.Un fisioterapeuta puede enseñarle ejercicios que contribuyan a mejorar mak movimiento y fuerza, y a aliviar el dolor.  Acuda en 2 semanas a mak romero de control con mak médico, o según le indicaron:Anote aman preguntas para que se acuerde de hacerlas nicky aman visitas.

## 2021-09-08 NOTE — ED ADULT TRIAGE NOTE - CHIEF COMPLAINT QUOTE
Pt. c/o non-radiating lower back pain starting around 11am. Having difficulty walking since. No injury.     Hieu (daughter) [phone]

## 2021-09-08 NOTE — ED PROVIDER NOTE - CLINICAL SUMMARY MEDICAL DECISION MAKING FREE TEXT BOX
54 y/o female c/o lower back pain  -likely msk pain - strain vs radicular pain  -pain control  -reassess

## 2023-02-03 ENCOUNTER — OFFICE (OUTPATIENT)
Dept: URBAN - METROPOLITAN AREA CLINIC 90 | Facility: CLINIC | Age: 58
Setting detail: OPHTHALMOLOGY
End: 2023-02-03
Payer: MEDICAID

## 2023-02-03 DIAGNOSIS — Z96.1: ICD-10-CM

## 2023-02-03 DIAGNOSIS — H35.372: ICD-10-CM

## 2023-02-03 DIAGNOSIS — H25.12: ICD-10-CM

## 2023-02-03 DIAGNOSIS — H01.002: ICD-10-CM

## 2023-02-03 DIAGNOSIS — H01.005: ICD-10-CM

## 2023-02-03 DIAGNOSIS — H01.001: ICD-10-CM

## 2023-02-03 DIAGNOSIS — H01.004: ICD-10-CM

## 2023-02-03 PROCEDURE — 92134 CPTRZ OPH DX IMG PST SGM RTA: CPT | Performed by: OPHTHALMOLOGY

## 2023-02-03 PROCEDURE — 92004 COMPRE OPH EXAM NEW PT 1/>: CPT | Performed by: OPHTHALMOLOGY

## 2023-02-03 ASSESSMENT — LID EXAM ASSESSMENTS
OD_BLEPHARITIS: RLL RUL 2+
OS_BLEPHARITIS: LLL LUL 2+

## 2023-02-03 ASSESSMENT — REFRACTION_CURRENTRX
OS_ADD: +2.50
OS_CYLINDER: +0.25
OS_AXIS: 175
OD_SPHERE: -0.25
OD_AXIS: 001
OD_CYLINDER: +1.00
OD_OVR_VA: 20/
OS_OVR_VA: 20/
OD_ADD: +2.50
OD_VPRISM_DIRECTION: BF
OS_SPHERE: PLANO
OS_VPRISM_DIRECTION: BF

## 2023-02-03 ASSESSMENT — KERATOMETRY
OS_K2POWER_DIOPTERS: 45.25
OD_K1POWER_DIOPTERS: 44.50
OD_K2POWER_DIOPTERS: 45.50
METHOD_AUTO_MANUAL: AUTO
OD_AXISANGLE_DEGREES: 001
OS_AXISANGLE_DEGREES: 175
OS_K1POWER_DIOPTERS: 44.50

## 2023-02-03 ASSESSMENT — REFRACTION_MANIFEST
OS_CYLINDER: +0.75
OD_SPHERE: -0.25
OS_AXIS: 165
OD_CYLINDER: -1.00
OD_VA1: 20/20-
OD_SPHERE: -1.25
OS_ADD: +2.50
OS_SPHERE: -0.25
OS_VA2: 20/25(J1)
OD_AXIS: 090
OD_ADD: +2.50
OD_VA1: 20/25-
OD_VA2: 20/25(J1)
OD_SPHERE: -1.25
OD_AXIS: 005
OD_AXIS: 180
OS_VA1: 20/20
OD_CYLINDER: +0.25
OD_VA1: 20/25
OD_CYLINDER: +1.00

## 2023-02-03 ASSESSMENT — VISUAL ACUITY
OD_BCVA: 20/200
OS_BCVA: 20/50-1

## 2023-02-03 ASSESSMENT — TONOMETRY
OD_IOP_MMHG: 18
OS_IOP_MMHG: 18

## 2023-02-03 ASSESSMENT — CONFRONTATIONAL VISUAL FIELD TEST (CVF)
OD_FINDINGS: FULL
OS_FINDINGS: FULL

## 2023-02-03 ASSESSMENT — SPHEQUIV_DERIVED
OS_SPHEQUIV: 0.125
OD_SPHEQUIV: -0.75
OD_SPHEQUIV: -1.125
OS_SPHEQUIV: -4
OD_SPHEQUIV: -0.75
OD_SPHEQUIV: -0.75

## 2023-02-03 ASSESSMENT — REFRACTION_AUTOREFRACTION
OS_AXIS: 153
OD_CYLINDER: -1.00
OS_CYLINDER: -1.50
OD_SPHERE: -0.25
OS_SPHERE: -3.25
OD_AXIS: 087

## 2023-02-03 ASSESSMENT — AXIALLENGTH_DERIVED
OS_AL: 24.6945
OD_AL: 23.336
OS_AL: 23.0509
OD_AL: 23.336
OD_AL: 23.336
OD_AL: 23.4798

## 2023-06-13 ENCOUNTER — OFFICE (OUTPATIENT)
Dept: URBAN - METROPOLITAN AREA CLINIC 90 | Facility: CLINIC | Age: 58
Setting detail: OPHTHALMOLOGY
End: 2023-06-13
Payer: MEDICAID

## 2023-06-13 DIAGNOSIS — H25.12: ICD-10-CM

## 2023-06-13 PROCEDURE — 92136 OPHTHALMIC BIOMETRY: CPT | Performed by: OPHTHALMOLOGY

## 2023-06-13 ASSESSMENT — REFRACTION_MANIFEST
OD_SPHERE: -1.25
OS_VA2: 20/25(J1)
OD_CYLINDER: +1.00
OD_AXIS: 005
OS_CYLINDER: +0.75
OD_AXIS: 180
OD_SPHERE: -1.25
OD_VA1: 20/25
OD_ADD: +2.50
OD_CYLINDER: +0.25
OD_CYLINDER: -1.00
OD_SPHERE: -0.25
OS_SPHERE: -0.25
OD_VA2: 20/25(J1)
OD_VA1: 20/20-
OD_AXIS: 090
OD_VA1: 20/25-
OS_ADD: +2.50
OS_AXIS: 165
OS_VA1: 20/20

## 2023-06-13 ASSESSMENT — REFRACTION_AUTOREFRACTION
OD_SPHERE: -0.25
OS_CYLINDER: -1.50
OS_SPHERE: -3.25
OD_AXIS: 087
OD_CYLINDER: -1.00
OS_AXIS: 153

## 2023-06-13 ASSESSMENT — SPHEQUIV_DERIVED
OD_SPHEQUIV: -0.75
OD_SPHEQUIV: -0.75
OS_SPHEQUIV: 0.125
OS_SPHEQUIV: -4
OD_SPHEQUIV: -1.125
OD_SPHEQUIV: -0.75

## 2023-06-13 ASSESSMENT — REFRACTION_CURRENTRX
OS_SPHERE: PLANO
OS_ADD: +2.50
OS_CYLINDER: +0.25
OS_AXIS: 175
OD_ADD: +2.50
OD_CYLINDER: +1.00
OD_VPRISM_DIRECTION: BF
OD_OVR_VA: 20/
OS_VPRISM_DIRECTION: BF
OD_AXIS: 001
OD_SPHERE: -0.25
OS_OVR_VA: 20/

## 2023-06-13 ASSESSMENT — KERATOMETRY
OS_AXISANGLE_DEGREES: 175
METHOD_AUTO_MANUAL: AUTO
OS_K1POWER_DIOPTERS: 44.50
OD_K1POWER_DIOPTERS: 44.50
OD_K2POWER_DIOPTERS: 45.50
OD_AXISANGLE_DEGREES: 001
OS_K2POWER_DIOPTERS: 45.25

## 2023-06-13 ASSESSMENT — AXIALLENGTH_DERIVED
OS_AL: 24.6945
OS_AL: 23.0509
OD_AL: 23.336
OD_AL: 23.4798

## 2023-06-13 ASSESSMENT — LID EXAM ASSESSMENTS
OS_BLEPHARITIS: LLL LUL 2+
OD_BLEPHARITIS: RLL RUL 2+

## 2023-06-13 ASSESSMENT — CONFRONTATIONAL VISUAL FIELD TEST (CVF)
OD_FINDINGS: FULL
OS_FINDINGS: FULL

## 2023-06-13 ASSESSMENT — VISUAL ACUITY
OS_BCVA: 20/50-1
OD_BCVA: 20/200

## 2023-06-19 ENCOUNTER — AMBULATORY SURGERY CENTER (OUTPATIENT)
Dept: URBAN - METROPOLITAN AREA SURGERY 25 | Facility: SURGERY | Age: 58
Setting detail: OPHTHALMOLOGY
End: 2023-06-19
Payer: MEDICAID

## 2023-06-19 DIAGNOSIS — H25.22: ICD-10-CM

## 2023-06-19 DIAGNOSIS — H57.03: ICD-10-CM

## 2023-06-19 PROCEDURE — 66982 XCAPSL CTRC RMVL CPLX WO ECP: CPT | Performed by: OPHTHALMOLOGY

## 2023-06-20 ENCOUNTER — OFFICE (OUTPATIENT)
Dept: URBAN - METROPOLITAN AREA CLINIC 90 | Facility: CLINIC | Age: 58
Setting detail: OPHTHALMOLOGY
End: 2023-06-20
Payer: MEDICAID

## 2023-06-20 ENCOUNTER — RX ONLY (RX ONLY)
Age: 58
End: 2023-06-20

## 2023-06-20 DIAGNOSIS — Z96.1: ICD-10-CM

## 2023-06-20 PROCEDURE — 99024 POSTOP FOLLOW-UP VISIT: CPT | Performed by: OPHTHALMOLOGY

## 2023-06-20 ASSESSMENT — REFRACTION_CURRENTRX
OD_CYLINDER: +1.00
OS_OVR_VA: 20/
OS_AXIS: 175
OS_CYLINDER: +0.25
OD_OVR_VA: 20/
OD_VPRISM_DIRECTION: BF
OD_SPHERE: -0.25
OD_ADD: +2.50
OD_AXIS: 001
OS_SPHERE: PLANO
OS_ADD: +2.50
OS_VPRISM_DIRECTION: BF

## 2023-06-20 ASSESSMENT — REFRACTION_MANIFEST
OS_VA2: 20/25(J1)
OS_SPHERE: -0.25
OD_AXIS: 180
OD_SPHERE: -1.25
OD_AXIS: 090
OD_CYLINDER: -1.00
OD_VA2: 20/25(J1)
OD_AXIS: 005
OD_VA1: 20/20-
OS_VA1: 20/20
OD_SPHERE: -0.25
OS_CYLINDER: +0.75
OD_CYLINDER: +0.25
OS_ADD: +2.50
OD_ADD: +2.50
OS_AXIS: 165
OD_CYLINDER: +1.00
OD_VA1: 20/25-
OD_VA1: 20/25
OD_SPHERE: -1.25

## 2023-06-20 ASSESSMENT — REFRACTION_AUTOREFRACTION
OS_CYLINDER: -1.50
OS_SPHERE: -3.25
OS_AXIS: 153
OD_SPHERE: -0.25
OD_AXIS: 087
OD_CYLINDER: -1.00

## 2023-06-20 ASSESSMENT — CONFRONTATIONAL VISUAL FIELD TEST (CVF)
OD_FINDINGS: FULL
OS_FINDINGS: FULL

## 2023-06-20 ASSESSMENT — CORNEAL EDEMA CLINICAL DESCRIPTION: OS_CORNEALEDEMA: DEEP FOLDS CENTRALLY

## 2023-06-20 ASSESSMENT — AXIALLENGTH_DERIVED
OD_AL: 23.4798
OS_AL: 24.6945
OD_AL: 23.336
OD_AL: 23.336
OS_AL: 23.0509
OD_AL: 23.336

## 2023-06-20 ASSESSMENT — KERATOMETRY
OS_K1POWER_DIOPTERS: 44.50
OD_AXISANGLE_DEGREES: 001
OS_K2POWER_DIOPTERS: 45.25
OS_AXISANGLE_DEGREES: 175
OD_K2POWER_DIOPTERS: 45.50
OD_K1POWER_DIOPTERS: 44.50
METHOD_AUTO_MANUAL: AUTO

## 2023-06-20 ASSESSMENT — CORNEAL EDEMA - FOLDS/STRIAE: OS_FOLDSSTRIAE: 2+

## 2023-06-20 ASSESSMENT — SPHEQUIV_DERIVED
OD_SPHEQUIV: -0.75
OS_SPHEQUIV: 0.125
OD_SPHEQUIV: -1.125
OD_SPHEQUIV: -0.75
OD_SPHEQUIV: -0.75
OS_SPHEQUIV: -4

## 2023-06-20 ASSESSMENT — LID EXAM ASSESSMENTS
OS_BLEPHARITIS: LLL LUL 2+
OD_BLEPHARITIS: RLL RUL 2+

## 2023-06-20 ASSESSMENT — VISUAL ACUITY
OD_BCVA: 20/50+2
OS_BCVA: 20/25+2

## 2023-06-27 ENCOUNTER — OFFICE (OUTPATIENT)
Dept: URBAN - METROPOLITAN AREA CLINIC 90 | Facility: CLINIC | Age: 58
Setting detail: OPHTHALMOLOGY
End: 2023-06-27
Payer: MEDICAID

## 2023-06-27 DIAGNOSIS — Z96.1: ICD-10-CM

## 2023-06-27 PROCEDURE — 99024 POSTOP FOLLOW-UP VISIT: CPT | Performed by: OPHTHALMOLOGY

## 2023-06-27 ASSESSMENT — REFRACTION_MANIFEST
OS_CYLINDER: +0.75
OD_AXIS: 180
OD_VA1: 20/25
OS_VA2: 20/25(J1)
OD_CYLINDER: -1.00
OS_ADD: +2.50
OS_VA1: 20/20
OD_SPHERE: -0.25
OD_AXIS: 005
OD_SPHERE: -1.25
OD_CYLINDER: +1.00
OS_AXIS: 165
OD_VA1: 20/25-
OD_AXIS: 090
OD_VA1: 20/20-
OD_CYLINDER: +0.25
OD_VA2: 20/25(J1)
OS_SPHERE: -0.25
OD_ADD: +2.50
OD_SPHERE: -1.25

## 2023-06-27 ASSESSMENT — AXIALLENGTH_DERIVED
OD_AL: 23.336
OD_AL: 23.4798
OD_AL: 23.336
OD_AL: 23.336
OS_AL: 24.6945
OS_AL: 23.0509

## 2023-06-27 ASSESSMENT — VISUAL ACUITY
OD_BCVA: 20/25-2
OS_BCVA: 20/50-1

## 2023-06-27 ASSESSMENT — REFRACTION_CURRENTRX
OS_OVR_VA: 20/
OS_SPHERE: PLANO
OD_CYLINDER: +1.00
OD_OVR_VA: 20/
OS_VPRISM_DIRECTION: BF
OD_ADD: +2.50
OS_CYLINDER: +0.25
OS_AXIS: 175
OD_VPRISM_DIRECTION: BF
OD_SPHERE: -0.25
OD_AXIS: 001
OS_ADD: +2.50

## 2023-06-27 ASSESSMENT — REFRACTION_AUTOREFRACTION
OD_SPHERE: -0.25
OS_AXIS: 153
OD_AXIS: 087
OS_SPHERE: -3.25
OS_CYLINDER: -1.50
OD_CYLINDER: -1.00

## 2023-06-27 ASSESSMENT — KERATOMETRY
OD_K1POWER_DIOPTERS: 44.50
OS_AXISANGLE_DEGREES: 175
OS_K1POWER_DIOPTERS: 44.50
OD_K2POWER_DIOPTERS: 45.50
OD_AXISANGLE_DEGREES: 001
OS_K2POWER_DIOPTERS: 45.25
METHOD_AUTO_MANUAL: AUTO

## 2023-06-27 ASSESSMENT — SPHEQUIV_DERIVED
OD_SPHEQUIV: -0.75
OD_SPHEQUIV: -0.75
OS_SPHEQUIV: -4
OD_SPHEQUIV: -1.125
OD_SPHEQUIV: -0.75
OS_SPHEQUIV: 0.125

## 2023-06-27 ASSESSMENT — CONFRONTATIONAL VISUAL FIELD TEST (CVF)
OD_FINDINGS: FULL
OS_FINDINGS: FULL

## 2023-06-27 ASSESSMENT — LID EXAM ASSESSMENTS: OD_BLEPHARITIS: RLL RUL 2+

## 2023-06-27 ASSESSMENT — CORNEAL EDEMA CLINICAL DESCRIPTION: OS_CORNEALEDEMA: T OVER INCISION

## 2023-06-27 ASSESSMENT — TONOMETRY: OS_IOP_MMHG: 19

## 2023-07-18 ENCOUNTER — OFFICE (OUTPATIENT)
Dept: URBAN - METROPOLITAN AREA CLINIC 90 | Facility: CLINIC | Age: 58
Setting detail: OPHTHALMOLOGY
End: 2023-07-18
Payer: MEDICAID

## 2023-07-18 DIAGNOSIS — Z96.1: ICD-10-CM

## 2023-07-18 PROCEDURE — 99024 POSTOP FOLLOW-UP VISIT: CPT | Performed by: OPHTHALMOLOGY

## 2023-07-18 ASSESSMENT — KERATOMETRY
OD_AXISANGLE_DEGREES: 090
OS_K2POWER_DIOPTERS: 45.00
OS_AXISANGLE_DEGREES: 161
METHOD_AUTO_MANUAL: AUTO
OD_K2POWER_DIOPTERS: 45.00
OS_K1POWER_DIOPTERS: 44.25
OD_K1POWER_DIOPTERS: 45.00

## 2023-07-18 ASSESSMENT — REFRACTION_MANIFEST
OD_VA2: 20/25(J1)
OD_SPHERE: -1.25
OD_AXIS: 180
OS_VA1: 20/20
OS_AXIS: 165
OD_SPHERE: -0.25
OD_VA1: 20/25-
OD_ADD: +2.50
OD_AXIS: 090
OD_CYLINDER: +1.00
OD_SPHERE: -1.25
OD_CYLINDER: +0.25
OS_VA2: 20/25(J1)
OD_CYLINDER: -1.00
OS_SPHERE: -0.25
OS_CYLINDER: +0.75
OD_VA1: 20/25
OD_AXIS: 005
OS_ADD: +2.50
OD_VA1: 20/20-

## 2023-07-18 ASSESSMENT — REFRACTION_CURRENTRX
OS_CYLINDER: -0.75
OD_OVR_VA: 20/
OD_SPHERE: +1.75
OD_SPHERE: -0.25
OS_AXIS: 175
OS_SPHERE: +3.25
OD_OVR_VA: 20/
OD_AXIS: 092
OS_SPHERE: +0.75
OS_SPHERE: PLANO
OS_OVR_VA: 20/
OS_OVR_VA: 20/
OD_CYLINDER: -1.00
OD_ADD: +2.50
OD_SPHERE: -0.50
OS_VPRISM_DIRECTION: SV
OS_AXIS: 084
OS_ADD: +2.50
OS_CYLINDER: -0.75
OD_CYLINDER: -0.75
OS_VPRISM_DIRECTION: SV
OS_AXIS: 083
OS_CYLINDER: +0.25
OD_AXIS: 001
OD_OVR_VA: 20/
OD_VPRISM_DIRECTION: SV
OD_AXIS: 091
OD_VPRISM_DIRECTION: SV
OS_VPRISM_DIRECTION: BF
OS_OVR_VA: 20/
OD_CYLINDER: +1.00
OD_VPRISM_DIRECTION: BF

## 2023-07-18 ASSESSMENT — SPHEQUIV_DERIVED
OD_SPHEQUIV: -0.875
OD_SPHEQUIV: -1.125
OD_SPHEQUIV: -0.75
OS_SPHEQUIV: -0.625
OD_SPHEQUIV: -0.75
OS_SPHEQUIV: 0.125

## 2023-07-18 ASSESSMENT — CONFRONTATIONAL VISUAL FIELD TEST (CVF)
OS_FINDINGS: FULL
OD_FINDINGS: FULL

## 2023-07-18 ASSESSMENT — AXIALLENGTH_DERIVED
OS_AL: 23.4234
OD_AL: 23.4798
OD_AL: 23.3837
OS_AL: 23.1389
OD_AL: 23.336
OD_AL: 23.336

## 2023-07-18 ASSESSMENT — REFRACTION_AUTOREFRACTION
OD_AXIS: 089
OS_SPHERE: 0.00
OS_AXIS: 077
OD_CYLINDER: -0.75
OD_SPHERE: -0.50
OS_CYLINDER: -1.25

## 2023-07-18 ASSESSMENT — VISUAL ACUITY
OS_BCVA: 20/25
OD_BCVA: 20/30-1

## 2023-07-18 ASSESSMENT — CORNEAL EDEMA CLINICAL DESCRIPTION: OS_CORNEALEDEMA: T OVER INCISION

## 2023-07-18 ASSESSMENT — TONOMETRY: OS_IOP_MMHG: 18

## 2023-08-22 ENCOUNTER — OFFICE (OUTPATIENT)
Dept: URBAN - METROPOLITAN AREA CLINIC 90 | Facility: CLINIC | Age: 58
Setting detail: OPHTHALMOLOGY
End: 2023-08-22
Payer: COMMERCIAL

## 2023-08-22 DIAGNOSIS — Z96.1: ICD-10-CM

## 2023-08-22 PROCEDURE — 99024 POSTOP FOLLOW-UP VISIT: CPT | Performed by: OPHTHALMOLOGY

## 2023-08-22 ASSESSMENT — REFRACTION_CURRENTRX
OD_CYLINDER: -0.75
OD_AXIS: 095
OD_SPHERE: -0.25
OS_OVR_VA: 20/
OD_VPRISM_DIRECTION: BF
OS_VPRISM_DIRECTION: BF
OD_CYLINDER: -0.75
OD_OVR_VA: 20/
OD_SPHERE: +1.75
OS_SPHERE: +0.75
OS_ADD: +2.50
OS_AXIS: 092
OD_VPRISM_DIRECTION: SV
OD_OVR_VA: 20/
OS_VPRISM_DIRECTION: SV
OD_CYLINDER: +1.00
OD_OVR_VA: 20/
OS_CYLINDER: +0.25
OS_AXIS: 175
OD_AXIS: 099
OS_VPRISM_DIRECTION: SV
OS_CYLINDER: -0.75
OS_SPHERE: PLANO
OS_CYLINDER: -0.75
OD_ADD: +2.50
OS_OVR_VA: 20/
OS_AXIS: 090
OD_SPHERE: -0.50
OD_VPRISM_DIRECTION: SV
OS_SPHERE: +3.00
OS_OVR_VA: 20/
OD_AXIS: 001

## 2023-08-22 ASSESSMENT — KERATOMETRY
OD_AXISANGLE_DEGREES: 008
OS_AXISANGLE_DEGREES: 174
METHOD_AUTO_MANUAL: AUTO
OD_K1POWER_DIOPTERS: 44.25
OS_K2POWER_DIOPTERS: 45.25
OD_K2POWER_DIOPTERS: 45.00
OS_K1POWER_DIOPTERS: 44.00

## 2023-08-22 ASSESSMENT — REFRACTION_MANIFEST
OD_VA1: 20/20
OS_VA1: 20/20
OS_SPHERE: -0.25
OD_VA3: PDS
OD_SPHERE: -1.25
OD_VA2: 20/25(J1)
OS_CYLINDER: +0.75
OS_SPHERE: PLANO
OD_ADD: +2.50
OD_AXIS: 180
OD_CYLINDER: +0.25
OD_ADD: +2.75
OD_CYLINDER: -1.00
OD_SPHERE: -0.25
OS_AXIS: 085
OS_ADD: +2.50
OD_VA1: 20/20-
OS_VA1: 20/20
OD_VA1: 20/25
OS_CYLINDER: -1.50
OD_AXIS: 005
OS_ADD: +2.75
OD_AXIS: 090
OS_AXIS: 165
OD_VA1: 20/25-
OS_VA2: 20/25(J1)
OD_SPHERE: -1.25
OD_AXIS: 090
OD_SPHERE: -0.25
OD_CYLINDER: -1.00
OD_CYLINDER: +1.00

## 2023-08-22 ASSESSMENT — REFRACTION_AUTOREFRACTION
OD_CYLINDER: -1.00
OD_AXIS: 091
OS_AXIS: 084
OS_SPHERE: +0.50
OD_SPHERE: -0.25
OS_CYLINDER: -1.75

## 2023-08-22 ASSESSMENT — AXIALLENGTH_DERIVED
OD_AL: 23.617
OS_AL: 23.3278
OD_AL: 23.4715
OD_AL: 23.4715
OS_AL: 23.1389
OD_AL: 23.4715
OD_AL: 23.4715

## 2023-08-22 ASSESSMENT — SPHEQUIV_DERIVED
OD_SPHEQUIV: -0.75
OS_SPHEQUIV: 0.125
OD_SPHEQUIV: -0.75
OD_SPHEQUIV: -0.75
OD_SPHEQUIV: -1.125
OS_SPHEQUIV: -0.375
OD_SPHEQUIV: -0.75

## 2023-08-22 ASSESSMENT — VISUAL ACUITY
OS_BCVA: 20/20-1
OD_BCVA: 20/25+2

## 2023-08-22 ASSESSMENT — LID EXAM ASSESSMENTS: OD_BLEPHARITIS: RLL RUL 2+

## 2023-08-22 ASSESSMENT — CONFRONTATIONAL VISUAL FIELD TEST (CVF)
OS_FINDINGS: FULL
OD_FINDINGS: FULL

## 2023-08-22 ASSESSMENT — CORNEAL EDEMA CLINICAL DESCRIPTION: OS_CORNEALEDEMA: ABSENT

## 2023-08-22 ASSESSMENT — TONOMETRY: OS_IOP_MMHG: 20

## 2023-12-08 VITALS
TEMPERATURE: 98 F | HEART RATE: 73 BPM | WEIGHT: 123.02 LBS | DIASTOLIC BLOOD PRESSURE: 70 MMHG | SYSTOLIC BLOOD PRESSURE: 132 MMHG | RESPIRATION RATE: 16 BRPM | HEIGHT: 64 IN | OXYGEN SATURATION: 98 %

## 2023-12-08 RX ORDER — CHLORHEXIDINE GLUCONATE 213 G/1000ML
1 SOLUTION TOPICAL ONCE
Refills: 0 | Status: DISCONTINUED | OUTPATIENT
Start: 2023-12-11 | End: 2023-12-26

## 2023-12-08 RX ORDER — METOPROLOL TARTRATE 50 MG
1 TABLET ORAL
Refills: 0 | DISCHARGE

## 2023-12-08 RX ORDER — ASPIRIN/CALCIUM CARB/MAGNESIUM 324 MG
81 TABLET ORAL ONCE
Refills: 0 | Status: DISCONTINUED | OUTPATIENT
Start: 2023-12-11 | End: 2023-12-11

## 2023-12-08 RX ORDER — CLOPIDOGREL BISULFATE 75 MG/1
600 TABLET, FILM COATED ORAL ONCE
Refills: 0 | Status: DISCONTINUED | OUTPATIENT
Start: 2023-12-11 | End: 2023-12-11

## 2023-12-08 RX ORDER — SODIUM CHLORIDE 9 MG/ML
250 INJECTION INTRAMUSCULAR; INTRAVENOUS; SUBCUTANEOUS ONCE
Refills: 0 | Status: COMPLETED | OUTPATIENT
Start: 2023-12-11 | End: 2023-12-11

## 2023-12-08 RX ORDER — ASPIRIN/CALCIUM CARB/MAGNESIUM 324 MG
1 TABLET ORAL
Refills: 0 | DISCHARGE

## 2023-12-08 RX ORDER — ISOSORBIDE MONONITRATE 60 MG/1
1 TABLET, EXTENDED RELEASE ORAL
Refills: 0 | DISCHARGE

## 2023-12-08 RX ORDER — SODIUM CHLORIDE 9 MG/ML
1000 INJECTION INTRAMUSCULAR; INTRAVENOUS; SUBCUTANEOUS
Refills: 0 | Status: DISCONTINUED | OUTPATIENT
Start: 2023-12-11 | End: 2023-12-11

## 2023-12-08 NOTE — H&P ADULT - ASSESSMENT
Pt is 57yo Female w/ PMHx of dermatitis, HTN who presented today to [facility] for recommended cardiac cath with possible intervention in light of  pt's risk factors, CCS 3 anginal symptoms and abnormal stress ECHO.     ASA III and Mallampati III    OF NOTE: Pt took daily dose of ASA 81 mg POx1 this am, no further load as per discussion with dr. Manzo.     the procedure was explained and consent was taken with the help of [employer]  # [id number]    The medication list was confirmed with the pt     Risks & benefits of procedure and alternative therapy have been explained to the patient including but not limited to: allergic reaction, bleeding w/possible need for blood transfusion, infection, renal and vascular compromise, limb damage, arrhythmia, stroke, vessel dissection/perforation, Myocardial infarction, emergent CABG. Informed consent obtained and in chart.  Pt is 57yo Female w/ PMHx of dermatitis, HTN who presented today to [facility] for recommended cardiac cath with possible intervention in light of  pt's risk factors, CCS 3 anginal symptoms and abnormal stress ECHO.     ASA III and Mallampati III    OF NOTE: Pt took daily dose of ASA 81 mg POx1 this am, no further load as per discussion with dr. Manzo.     the procedure was explained and consent was taken with the help of [employer]  # [id number]    The medication list was confirmed with the pt     Risks & benefits of procedure and alternative therapy have been explained to the patient including but not limited to: allergic reaction, bleeding w/possible need for blood transfusion, infection, renal and vascular compromise, limb damage, arrhythmia, stroke, vessel dissection/perforation, Myocardial infarction, emergent CABG. Informed consent obtained and in chart.

## 2023-12-08 NOTE — H&P ADULT - HISTORY OF PRESENT ILLNESS
CARDIOLOGIST: Dr. Hartmann  PHARMACY:     Pt is 57yo Female w/ PMHx of dermatitis, HTN who presented to outpatient cardiologist, Dr. Hartmann, endorsing progressively worsening dyspnea on exertion as well as chest discomfort with exertion that is L sided and has been worsening. Pt denies dizziness, syncope orthopnea, LE edema, PND, diaphoresis, abdominal pain, N/V, fever/chills.     TTE (per MD note): LVEF 60-65%, G I DD, borderline cLVH, mild TR.   Stress Echo (10/24/23): horizontal 1.5mm ST depression appreciated in inferolateral leads at peak stress; pt developed SOB. (-) for inducible ischemia.     In light of patient's risk factors, CCS Class III anginal symptoms, and abnormal stress echo, patient now presents to [facility] for cardiac catheterization with possible intervention if clinically indicated.  CARDIOLOGIST: Dr. Hartmann  PHARMACY:     Pt is 59yo Female w/ PMHx of dermatitis, HTN who presented to outpatient cardiologist, Dr. Hartmann, endorsing progressively worsening dyspnea on exertion as well as chest discomfort with exertion that is L sided and has been worsening. Pt denies dizziness, syncope orthopnea, LE edema, PND, diaphoresis, abdominal pain, N/V, fever/chills.     TTE (per MD note): LVEF 60-65%, G I DD, borderline cLVH, mild TR.   Stress Echo (10/24/23): horizontal 1.5mm ST depression appreciated in inferolateral leads at peak stress; pt developed SOB. (-) for inducible ischemia.     In light of patient's risk factors, CCS Class III anginal symptoms, and abnormal stress echo, patient now presents to [facility] for cardiac catheterization with possible intervention if clinically indicated.  CARDIOLOGIST: Dr. Hartmann  PHARMACY: [facility] [address] [place] [address]    [phone]  Escort : son     Pt is 59yo Female w/ PMHx of dermatitis, HTN who presented to outpatient cardiologist, Dr. Hartmann, endorsing progressively worsening dyspnea on exertion with going up less than one flight of stairs. Pt denies CP, dizziness, syncope orthopnea, LE edema, PND, diaphoresis, abdominal pain, N/V, fever/chills.     TTE (per MD note): LVEF 60-65%, G I DD, borderline cLVH, mild TR.   Stress Echo (10/24/23): horizontal 1.5mm ST depression appreciated in inferolateral leads at peak stress; pt developed SOB. (-) for inducible ischemia.     In light of patient's risk factors, CCS Class III anginal symptoms, and abnormal stress echo, patient now presents to [facility] for cardiac catheterization with possible intervention if clinically indicated.  CARDIOLOGIST: Dr. Hartmann  PHARMACY: [facility] [address] [place] [address]    [phone]  Escort : son     Pt is 57yo Female w/ PMHx of dermatitis, HTN who presented to outpatient cardiologist, Dr. Hartmann, endorsing progressively worsening dyspnea on exertion with going up less than one flight of stairs. Pt denies CP, dizziness, syncope orthopnea, LE edema, PND, diaphoresis, abdominal pain, N/V, fever/chills.     TTE (per MD note): LVEF 60-65%, G I DD, borderline cLVH, mild TR.   Stress Echo (10/24/23): horizontal 1.5mm ST depression appreciated in inferolateral leads at peak stress; pt developed SOB. (-) for inducible ischemia.     In light of patient's risk factors, CCS Class III anginal symptoms, and abnormal stress echo, patient now presents to [facility] for cardiac catheterization with possible intervention if clinically indicated.

## 2023-12-11 ENCOUNTER — OUTPATIENT (OUTPATIENT)
Dept: OUTPATIENT SERVICES | Facility: HOSPITAL | Age: 58
LOS: 1 days | Discharge: HOME | End: 2023-12-11
Payer: MEDICAID

## 2023-12-11 LAB
A1C WITH ESTIMATED AVERAGE GLUCOSE RESULT: 5.8 % — HIGH (ref 4–5.6)
A1C WITH ESTIMATED AVERAGE GLUCOSE RESULT: 5.8 % — HIGH (ref 4–5.6)
ALBUMIN SERPL ELPH-MCNC: 4.3 G/DL — SIGNIFICANT CHANGE UP (ref 3.3–5)
ALBUMIN SERPL ELPH-MCNC: 4.3 G/DL — SIGNIFICANT CHANGE UP (ref 3.3–5)
ALP SERPL-CCNC: 96 U/L — SIGNIFICANT CHANGE UP (ref 40–120)
ALP SERPL-CCNC: 96 U/L — SIGNIFICANT CHANGE UP (ref 40–120)
ALT FLD-CCNC: 17 U/L — SIGNIFICANT CHANGE UP (ref 10–45)
ALT FLD-CCNC: 17 U/L — SIGNIFICANT CHANGE UP (ref 10–45)
ANION GAP SERPL CALC-SCNC: 11 MMOL/L — SIGNIFICANT CHANGE UP (ref 5–17)
ANION GAP SERPL CALC-SCNC: 11 MMOL/L — SIGNIFICANT CHANGE UP (ref 5–17)
APTT BLD: 36 SEC — HIGH (ref 24.5–35.6)
APTT BLD: 36 SEC — HIGH (ref 24.5–35.6)
AST SERPL-CCNC: 23 U/L — SIGNIFICANT CHANGE UP (ref 10–40)
AST SERPL-CCNC: 23 U/L — SIGNIFICANT CHANGE UP (ref 10–40)
BASOPHILS # BLD AUTO: 0.1 K/UL — SIGNIFICANT CHANGE UP (ref 0–0.2)
BASOPHILS # BLD AUTO: 0.1 K/UL — SIGNIFICANT CHANGE UP (ref 0–0.2)
BASOPHILS NFR BLD AUTO: 0.9 % — SIGNIFICANT CHANGE UP (ref 0–2)
BASOPHILS NFR BLD AUTO: 0.9 % — SIGNIFICANT CHANGE UP (ref 0–2)
BILIRUB SERPL-MCNC: 0.2 MG/DL — SIGNIFICANT CHANGE UP (ref 0.2–1.2)
BILIRUB SERPL-MCNC: 0.2 MG/DL — SIGNIFICANT CHANGE UP (ref 0.2–1.2)
BUN SERPL-MCNC: 16 MG/DL — SIGNIFICANT CHANGE UP (ref 7–23)
BUN SERPL-MCNC: 16 MG/DL — SIGNIFICANT CHANGE UP (ref 7–23)
CALCIUM SERPL-MCNC: 10 MG/DL — SIGNIFICANT CHANGE UP (ref 8.4–10.5)
CALCIUM SERPL-MCNC: 10 MG/DL — SIGNIFICANT CHANGE UP (ref 8.4–10.5)
CHLORIDE SERPL-SCNC: 103 MMOL/L — SIGNIFICANT CHANGE UP (ref 96–108)
CHLORIDE SERPL-SCNC: 103 MMOL/L — SIGNIFICANT CHANGE UP (ref 96–108)
CHOLEST SERPL-MCNC: 169 MG/DL — SIGNIFICANT CHANGE UP
CHOLEST SERPL-MCNC: 169 MG/DL — SIGNIFICANT CHANGE UP
CK MB CFR SERPL CALC: 1.1 NG/ML — SIGNIFICANT CHANGE UP (ref 0–6.7)
CK MB CFR SERPL CALC: 1.1 NG/ML — SIGNIFICANT CHANGE UP (ref 0–6.7)
CK SERPL-CCNC: 68 U/L — SIGNIFICANT CHANGE UP (ref 25–170)
CK SERPL-CCNC: 68 U/L — SIGNIFICANT CHANGE UP (ref 25–170)
CO2 SERPL-SCNC: 24 MMOL/L — SIGNIFICANT CHANGE UP (ref 22–31)
CO2 SERPL-SCNC: 24 MMOL/L — SIGNIFICANT CHANGE UP (ref 22–31)
CREAT SERPL-MCNC: 0.84 MG/DL — SIGNIFICANT CHANGE UP (ref 0.5–1.3)
CREAT SERPL-MCNC: 0.84 MG/DL — SIGNIFICANT CHANGE UP (ref 0.5–1.3)
EGFR: 80 ML/MIN/1.73M2 — SIGNIFICANT CHANGE UP
EGFR: 80 ML/MIN/1.73M2 — SIGNIFICANT CHANGE UP
EOSINOPHIL # BLD AUTO: 1.52 K/UL — HIGH (ref 0–0.5)
EOSINOPHIL # BLD AUTO: 1.52 K/UL — HIGH (ref 0–0.5)
EOSINOPHIL NFR BLD AUTO: 14.3 % — HIGH (ref 0–6)
EOSINOPHIL NFR BLD AUTO: 14.3 % — HIGH (ref 0–6)
ESTIMATED AVERAGE GLUCOSE: 120 MG/DL — HIGH (ref 68–114)
ESTIMATED AVERAGE GLUCOSE: 120 MG/DL — HIGH (ref 68–114)
GLUCOSE SERPL-MCNC: 84 MG/DL — SIGNIFICANT CHANGE UP (ref 70–99)
GLUCOSE SERPL-MCNC: 84 MG/DL — SIGNIFICANT CHANGE UP (ref 70–99)
HCT VFR BLD CALC: 40.1 % — SIGNIFICANT CHANGE UP (ref 34.5–45)
HCT VFR BLD CALC: 40.1 % — SIGNIFICANT CHANGE UP (ref 34.5–45)
HDLC SERPL-MCNC: 58 MG/DL — SIGNIFICANT CHANGE UP
HDLC SERPL-MCNC: 58 MG/DL — SIGNIFICANT CHANGE UP
HGB BLD-MCNC: 13.2 G/DL — SIGNIFICANT CHANGE UP (ref 11.5–15.5)
HGB BLD-MCNC: 13.2 G/DL — SIGNIFICANT CHANGE UP (ref 11.5–15.5)
IMM GRANULOCYTES NFR BLD AUTO: 0.1 % — SIGNIFICANT CHANGE UP (ref 0–0.9)
IMM GRANULOCYTES NFR BLD AUTO: 0.1 % — SIGNIFICANT CHANGE UP (ref 0–0.9)
INR BLD: 0.9 — SIGNIFICANT CHANGE UP (ref 0.85–1.18)
INR BLD: 0.9 — SIGNIFICANT CHANGE UP (ref 0.85–1.18)
LIPID PNL WITH DIRECT LDL SERPL: 96 MG/DL — SIGNIFICANT CHANGE UP
LIPID PNL WITH DIRECT LDL SERPL: 96 MG/DL — SIGNIFICANT CHANGE UP
LYMPHOCYTES # BLD AUTO: 3.78 K/UL — HIGH (ref 1–3.3)
LYMPHOCYTES # BLD AUTO: 3.78 K/UL — HIGH (ref 1–3.3)
LYMPHOCYTES # BLD AUTO: 35.5 % — SIGNIFICANT CHANGE UP (ref 13–44)
LYMPHOCYTES # BLD AUTO: 35.5 % — SIGNIFICANT CHANGE UP (ref 13–44)
MAGNESIUM SERPL-MCNC: 2.2 MG/DL — SIGNIFICANT CHANGE UP (ref 1.6–2.6)
MAGNESIUM SERPL-MCNC: 2.2 MG/DL — SIGNIFICANT CHANGE UP (ref 1.6–2.6)
MCHC RBC-ENTMCNC: 26.4 PG — LOW (ref 27–34)
MCHC RBC-ENTMCNC: 26.4 PG — LOW (ref 27–34)
MCHC RBC-ENTMCNC: 32.9 GM/DL — SIGNIFICANT CHANGE UP (ref 32–36)
MCHC RBC-ENTMCNC: 32.9 GM/DL — SIGNIFICANT CHANGE UP (ref 32–36)
MCV RBC AUTO: 80.2 FL — SIGNIFICANT CHANGE UP (ref 80–100)
MCV RBC AUTO: 80.2 FL — SIGNIFICANT CHANGE UP (ref 80–100)
MONOCYTES # BLD AUTO: 0.55 K/UL — SIGNIFICANT CHANGE UP (ref 0–0.9)
MONOCYTES # BLD AUTO: 0.55 K/UL — SIGNIFICANT CHANGE UP (ref 0–0.9)
MONOCYTES NFR BLD AUTO: 5.2 % — SIGNIFICANT CHANGE UP (ref 2–14)
MONOCYTES NFR BLD AUTO: 5.2 % — SIGNIFICANT CHANGE UP (ref 2–14)
NEUTROPHILS # BLD AUTO: 4.69 K/UL — SIGNIFICANT CHANGE UP (ref 1.8–7.4)
NEUTROPHILS # BLD AUTO: 4.69 K/UL — SIGNIFICANT CHANGE UP (ref 1.8–7.4)
NEUTROPHILS NFR BLD AUTO: 44 % — SIGNIFICANT CHANGE UP (ref 43–77)
NEUTROPHILS NFR BLD AUTO: 44 % — SIGNIFICANT CHANGE UP (ref 43–77)
NON HDL CHOLESTEROL: 111 MG/DL — SIGNIFICANT CHANGE UP
NON HDL CHOLESTEROL: 111 MG/DL — SIGNIFICANT CHANGE UP
NRBC # BLD: 0 /100 WBCS — SIGNIFICANT CHANGE UP (ref 0–0)
NRBC # BLD: 0 /100 WBCS — SIGNIFICANT CHANGE UP (ref 0–0)
PLATELET # BLD AUTO: 385 K/UL — SIGNIFICANT CHANGE UP (ref 150–400)
PLATELET # BLD AUTO: 385 K/UL — SIGNIFICANT CHANGE UP (ref 150–400)
POTASSIUM SERPL-MCNC: 4.1 MMOL/L — SIGNIFICANT CHANGE UP (ref 3.5–5.3)
POTASSIUM SERPL-MCNC: 4.1 MMOL/L — SIGNIFICANT CHANGE UP (ref 3.5–5.3)
POTASSIUM SERPL-SCNC: 4.1 MMOL/L — SIGNIFICANT CHANGE UP (ref 3.5–5.3)
POTASSIUM SERPL-SCNC: 4.1 MMOL/L — SIGNIFICANT CHANGE UP (ref 3.5–5.3)
PROT SERPL-MCNC: 8.6 G/DL — HIGH (ref 6–8.3)
PROT SERPL-MCNC: 8.6 G/DL — HIGH (ref 6–8.3)
PROTHROM AB SERPL-ACNC: 10.3 SEC — SIGNIFICANT CHANGE UP (ref 9.5–13)
PROTHROM AB SERPL-ACNC: 10.3 SEC — SIGNIFICANT CHANGE UP (ref 9.5–13)
RBC # BLD: 5 M/UL — SIGNIFICANT CHANGE UP (ref 3.8–5.2)
RBC # BLD: 5 M/UL — SIGNIFICANT CHANGE UP (ref 3.8–5.2)
RBC # FLD: 13.2 % — SIGNIFICANT CHANGE UP (ref 10.3–14.5)
RBC # FLD: 13.2 % — SIGNIFICANT CHANGE UP (ref 10.3–14.5)
SODIUM SERPL-SCNC: 138 MMOL/L — SIGNIFICANT CHANGE UP (ref 135–145)
SODIUM SERPL-SCNC: 138 MMOL/L — SIGNIFICANT CHANGE UP (ref 135–145)
TRIGL SERPL-MCNC: 77 MG/DL — SIGNIFICANT CHANGE UP
TRIGL SERPL-MCNC: 77 MG/DL — SIGNIFICANT CHANGE UP
WBC # BLD: 10.65 K/UL — HIGH (ref 3.8–10.5)
WBC # BLD: 10.65 K/UL — HIGH (ref 3.8–10.5)
WBC # FLD AUTO: 10.65 K/UL — HIGH (ref 3.8–10.5)
WBC # FLD AUTO: 10.65 K/UL — HIGH (ref 3.8–10.5)

## 2023-12-11 PROCEDURE — 83036 HEMOGLOBIN GLYCOSYLATED A1C: CPT

## 2023-12-11 PROCEDURE — 83735 ASSAY OF MAGNESIUM: CPT

## 2023-12-11 PROCEDURE — 99152 MOD SED SAME PHYS/QHP 5/>YRS: CPT

## 2023-12-11 PROCEDURE — C1769: CPT

## 2023-12-11 PROCEDURE — 82550 ASSAY OF CK (CPK): CPT

## 2023-12-11 PROCEDURE — 80053 COMPREHEN METABOLIC PANEL: CPT

## 2023-12-11 PROCEDURE — 85730 THROMBOPLASTIN TIME PARTIAL: CPT

## 2023-12-11 PROCEDURE — C1887: CPT

## 2023-12-11 PROCEDURE — C1894: CPT

## 2023-12-11 PROCEDURE — 85025 COMPLETE CBC W/AUTO DIFF WBC: CPT

## 2023-12-11 PROCEDURE — 93005 ELECTROCARDIOGRAM TRACING: CPT

## 2023-12-11 PROCEDURE — 93458 L HRT ARTERY/VENTRICLE ANGIO: CPT

## 2023-12-11 PROCEDURE — 93010 ELECTROCARDIOGRAM REPORT: CPT

## 2023-12-11 PROCEDURE — 36415 COLL VENOUS BLD VENIPUNCTURE: CPT

## 2023-12-11 PROCEDURE — 82553 CREATINE MB FRACTION: CPT

## 2023-12-11 PROCEDURE — 80061 LIPID PANEL: CPT

## 2023-12-11 PROCEDURE — 85610 PROTHROMBIN TIME: CPT

## 2023-12-11 RX ORDER — SODIUM CHLORIDE 9 MG/ML
500 INJECTION INTRAMUSCULAR; INTRAVENOUS; SUBCUTANEOUS
Refills: 0 | Status: DISCONTINUED | OUTPATIENT
Start: 2023-12-11 | End: 2023-12-26

## 2023-12-11 RX ORDER — HYDROXYZINE HCL 10 MG
1 TABLET ORAL
Refills: 0 | DISCHARGE

## 2023-12-11 RX ADMIN — SODIUM CHLORIDE 1000 MILLILITER(S): 9 INJECTION INTRAMUSCULAR; INTRAVENOUS; SUBCUTANEOUS at 15:27

## 2023-12-11 RX ADMIN — SODIUM CHLORIDE 75 MILLILITER(S): 9 INJECTION INTRAMUSCULAR; INTRAVENOUS; SUBCUTANEOUS at 15:27

## 2023-12-11 RX ADMIN — SODIUM CHLORIDE 100 MILLILITER(S): 9 INJECTION INTRAMUSCULAR; INTRAVENOUS; SUBCUTANEOUS at 17:22

## 2023-12-11 NOTE — PROGRESS NOTE ADULT - SUBJECTIVE AND OBJECTIVE BOX
Interventional Cardiology PA SDA Discharge Note    Patient without complaints.     Afebrile, VSS    Ext:    		  		        Right       Radial : no   hematoma,  no   bleeding, dressing; C/D/I      Pulses:    intact RAD to baseline     A/P:      59 y/o F w/ PMHx of dermatitis, HTN who presented to outpatient cardiologist, Dr. Hartmann, endorsing progressively worsening dyspnea on exertion with going up less than one flight of stairs. Pt denies CP, dizziness, syncope orthopnea, LE edema, PND, diaphoresis, abdominal pain, N/V, fever/chills. TTE (per MD note): LVEF 60-65%, G I DD, borderline cLVH, mild TR. Stress Echo (10/24/23): horizontal 1.5mm ST depression appreciated in inferolateral leads at peak stress; pt developed SOB. (-) for inducible ischemia.  In light of patient's risk factors, CCS Class III anginal symptoms, and abnormal stress echo, patient now presents to [facility] for cardiac catheterization with possible intervention if clinically indicated.     Pt. s/p cardiac cath 12/11/23: LM normal, minor irrgularities LAD/D1/Lcx/Om1/RCA, R Rad TR access.           1.	Stable for discharge as per attending  ___Shahbaz______ after bed rest, pt voids, wrist stable and 30 minutes of ambulation.  2.	Follow-up with PMD/Cardiologist _Dr. Hartmann__________ in 1-2 weeks  3.	Discharged forms signed and copies in chart    Interventional Cardiology PA SDA Discharge Note    Patient without complaints.     Afebrile, VSS    Ext:    		  		        Right       Radial : no   hematoma,  no   bleeding, dressing; C/D/I      Pulses:    intact RAD to baseline     A/P:      57 y/o F w/ PMHx of dermatitis, HTN who presented to outpatient cardiologist, Dr. Hartmann, endorsing progressively worsening dyspnea on exertion with going up less than one flight of stairs. Pt denies CP, dizziness, syncope orthopnea, LE edema, PND, diaphoresis, abdominal pain, N/V, fever/chills. TTE (per MD note): LVEF 60-65%, G I DD, borderline cLVH, mild TR. Stress Echo (10/24/23): horizontal 1.5mm ST depression appreciated in inferolateral leads at peak stress; pt developed SOB. (-) for inducible ischemia.  In light of patient's risk factors, CCS Class III anginal symptoms, and abnormal stress echo, patient now presents to [facility] for cardiac catheterization with possible intervention if clinically indicated.     Pt. s/p cardiac cath 12/11/23: LM normal, minor irrgularities LAD/D1/Lcx/Om1/RCA, R Rad TR access.           1.	Stable for discharge as per attending  ___Shahbaz______ after bed rest, pt voids, wrist stable and 30 minutes of ambulation.  2.	Follow-up with PMD/Cardiologist _Dr. Hartmann__________ in 1-2 weeks  3.	Discharged forms signed and copies in chart

## 2023-12-14 DIAGNOSIS — I25.110 ATHEROSCLEROTIC HEART DISEASE OF NATIVE CORONARY ARTERY WITH UNSTABLE ANGINA PECTORIS: ICD-10-CM

## 2023-12-14 DIAGNOSIS — R94.39 ABNORMAL RESULT OF OTHER CARDIOVASCULAR FUNCTION STUDY: ICD-10-CM

## 2025-01-30 ENCOUNTER — EMERGENCY (EMERGENCY)
Facility: HOSPITAL | Age: 60
LOS: 1 days | Discharge: HOME | End: 2025-01-30
Attending: EMERGENCY MEDICINE | Admitting: EMERGENCY MEDICINE
Payer: MEDICAID

## 2025-01-30 VITALS
TEMPERATURE: 98 F | WEIGHT: 128.09 LBS | HEIGHT: 64 IN | DIASTOLIC BLOOD PRESSURE: 76 MMHG | HEART RATE: 90 BPM | SYSTOLIC BLOOD PRESSURE: 125 MMHG | RESPIRATION RATE: 18 BRPM | OXYGEN SATURATION: 99 %

## 2025-01-30 VITALS
TEMPERATURE: 98 F | HEART RATE: 79 BPM | DIASTOLIC BLOOD PRESSURE: 75 MMHG | RESPIRATION RATE: 17 BRPM | SYSTOLIC BLOOD PRESSURE: 129 MMHG | OXYGEN SATURATION: 95 %

## 2025-01-30 LAB
ALBUMIN SERPL ELPH-MCNC: 3.8 G/DL — SIGNIFICANT CHANGE UP (ref 3.3–5)
ALP SERPL-CCNC: 81 U/L — SIGNIFICANT CHANGE UP (ref 40–120)
ALT FLD-CCNC: 17 U/L — SIGNIFICANT CHANGE UP (ref 4–33)
ANION GAP SERPL CALC-SCNC: 9 MMOL/L — SIGNIFICANT CHANGE UP (ref 7–14)
APPEARANCE UR: CLEAR — SIGNIFICANT CHANGE UP
AST SERPL-CCNC: 24 U/L — SIGNIFICANT CHANGE UP (ref 4–32)
BASOPHILS # BLD AUTO: 0.07 K/UL — SIGNIFICANT CHANGE UP (ref 0–0.2)
BASOPHILS NFR BLD AUTO: 0.8 % — SIGNIFICANT CHANGE UP (ref 0–2)
BILIRUB SERPL-MCNC: 0.4 MG/DL — SIGNIFICANT CHANGE UP (ref 0.2–1.2)
BILIRUB UR-MCNC: NEGATIVE — SIGNIFICANT CHANGE UP
BUN SERPL-MCNC: 13 MG/DL — SIGNIFICANT CHANGE UP (ref 7–23)
CALCIUM SERPL-MCNC: 9.3 MG/DL — SIGNIFICANT CHANGE UP (ref 8.4–10.5)
CHLORIDE SERPL-SCNC: 101 MMOL/L — SIGNIFICANT CHANGE UP (ref 98–107)
CO2 SERPL-SCNC: 26 MMOL/L — SIGNIFICANT CHANGE UP (ref 22–31)
COLOR SPEC: YELLOW — SIGNIFICANT CHANGE UP
CREAT SERPL-MCNC: 0.91 MG/DL — SIGNIFICANT CHANGE UP (ref 0.5–1.3)
DIFF PNL FLD: NEGATIVE — SIGNIFICANT CHANGE UP
EGFR: 72 ML/MIN/1.73M2 — SIGNIFICANT CHANGE UP
EGFR: 72 ML/MIN/1.73M2 — SIGNIFICANT CHANGE UP
EOSINOPHIL # BLD AUTO: 0.33 K/UL — SIGNIFICANT CHANGE UP (ref 0–0.5)
EOSINOPHIL NFR BLD AUTO: 3.8 % — SIGNIFICANT CHANGE UP (ref 0–6)
GLUCOSE SERPL-MCNC: 82 MG/DL — SIGNIFICANT CHANGE UP (ref 70–99)
GLUCOSE UR QL: NEGATIVE MG/DL — SIGNIFICANT CHANGE UP
HCT VFR BLD CALC: 38.9 % — SIGNIFICANT CHANGE UP (ref 34.5–45)
HGB BLD-MCNC: 12.9 G/DL — SIGNIFICANT CHANGE UP (ref 11.5–15.5)
IANC: 5.54 K/UL — SIGNIFICANT CHANGE UP (ref 1.8–7.4)
IMM GRANULOCYTES NFR BLD AUTO: 0.2 % — SIGNIFICANT CHANGE UP (ref 0–0.9)
KETONES UR-MCNC: NEGATIVE MG/DL — SIGNIFICANT CHANGE UP
LEUKOCYTE ESTERASE UR-ACNC: NEGATIVE — SIGNIFICANT CHANGE UP
LIDOCAIN IGE QN: 21 U/L — SIGNIFICANT CHANGE UP (ref 7–60)
LYMPHOCYTES # BLD AUTO: 2.17 K/UL — SIGNIFICANT CHANGE UP (ref 1–3.3)
LYMPHOCYTES # BLD AUTO: 25 % — SIGNIFICANT CHANGE UP (ref 13–44)
MCHC RBC-ENTMCNC: 26.3 PG — LOW (ref 27–34)
MCHC RBC-ENTMCNC: 33.2 G/DL — SIGNIFICANT CHANGE UP (ref 32–36)
MCV RBC AUTO: 79.2 FL — LOW (ref 80–100)
MONOCYTES # BLD AUTO: 0.55 K/UL — SIGNIFICANT CHANGE UP (ref 0–0.9)
MONOCYTES NFR BLD AUTO: 6.3 % — SIGNIFICANT CHANGE UP (ref 2–14)
NEUTROPHILS # BLD AUTO: 5.54 K/UL — SIGNIFICANT CHANGE UP (ref 1.8–7.4)
NEUTROPHILS NFR BLD AUTO: 63.9 % — SIGNIFICANT CHANGE UP (ref 43–77)
NITRITE UR-MCNC: NEGATIVE — SIGNIFICANT CHANGE UP
NRBC # BLD AUTO: 0 K/UL — SIGNIFICANT CHANGE UP (ref 0–0)
NRBC # BLD: 0 /100 WBCS — SIGNIFICANT CHANGE UP (ref 0–0)
NRBC # FLD: 0 K/UL — SIGNIFICANT CHANGE UP (ref 0–0)
NRBC BLD-RTO: 0 /100 WBCS — SIGNIFICANT CHANGE UP (ref 0–0)
PH UR: 7.5 — SIGNIFICANT CHANGE UP (ref 5–8)
PLATELET # BLD AUTO: 346 K/UL — SIGNIFICANT CHANGE UP (ref 150–400)
POTASSIUM SERPL-MCNC: 4.4 MMOL/L — SIGNIFICANT CHANGE UP (ref 3.5–5.3)
POTASSIUM SERPL-SCNC: 4.4 MMOL/L — SIGNIFICANT CHANGE UP (ref 3.5–5.3)
PROT SERPL-MCNC: 8.1 G/DL — SIGNIFICANT CHANGE UP (ref 6–8.3)
PROT UR-MCNC: NEGATIVE MG/DL — SIGNIFICANT CHANGE UP
RBC # BLD: 4.91 M/UL — SIGNIFICANT CHANGE UP (ref 3.8–5.2)
RBC # FLD: 13.8 % — SIGNIFICANT CHANGE UP (ref 10.3–14.5)
SODIUM SERPL-SCNC: 136 MMOL/L — SIGNIFICANT CHANGE UP (ref 135–145)
SP GR SPEC: 1.02 — SIGNIFICANT CHANGE UP (ref 1–1.03)
TROPONIN T, HIGH SENSITIVITY RESULT: <6 NG/L — SIGNIFICANT CHANGE UP
UROBILINOGEN FLD QL: 0.2 MG/DL — SIGNIFICANT CHANGE UP (ref 0.2–1)
WBC # BLD: 8.68 K/UL — SIGNIFICANT CHANGE UP (ref 3.8–10.5)
WBC # FLD AUTO: 8.68 K/UL — SIGNIFICANT CHANGE UP (ref 3.8–10.5)

## 2025-01-30 PROCEDURE — 99285 EMERGENCY DEPT VISIT HI MDM: CPT

## 2025-01-30 PROCEDURE — 74177 CT ABD & PELVIS W/CONTRAST: CPT | Mod: 26

## 2025-01-30 RX ORDER — CEFTRIAXONE 500 MG/1
1000 INJECTION, POWDER, FOR SOLUTION INTRAMUSCULAR; INTRAVENOUS ONCE
Refills: 0 | Status: COMPLETED | OUTPATIENT
Start: 2025-01-30 | End: 2025-01-30

## 2025-01-30 RX ORDER — MAGNESIUM, ALUMINUM HYDROXIDE 200-200 MG
30 TABLET,CHEWABLE ORAL ONCE
Refills: 0 | Status: COMPLETED | OUTPATIENT
Start: 2025-01-30 | End: 2025-01-30

## 2025-01-30 RX ORDER — SODIUM CHLORIDE 9 G/1000ML
1000 INJECTION, SOLUTION INTRAVENOUS ONCE
Refills: 0 | Status: COMPLETED | OUTPATIENT
Start: 2025-01-30 | End: 2025-01-30

## 2025-01-30 RX ORDER — SULFAMETHOXAZOLE/TRIMETHOPRIM 800-160 MG
1 TABLET ORAL
Qty: 28 | Refills: 0
Start: 2025-01-30 | End: 2025-02-12

## 2025-01-30 RX ORDER — ACETAMINOPHEN 500 MG/5ML
1000 LIQUID (ML) ORAL ONCE
Refills: 0 | Status: COMPLETED | OUTPATIENT
Start: 2025-01-30 | End: 2025-01-30

## 2025-01-30 RX ADMIN — Medication 20 MILLIGRAM(S): at 11:08

## 2025-01-30 RX ADMIN — Medication 30 MILLILITER(S): at 11:07

## 2025-01-30 RX ADMIN — SODIUM CHLORIDE 1000 MILLILITER(S): 9 INJECTION, SOLUTION INTRAVENOUS at 11:06

## 2025-01-30 RX ADMIN — CEFTRIAXONE 100 MILLIGRAM(S): 500 INJECTION, POWDER, FOR SOLUTION INTRAMUSCULAR; INTRAVENOUS at 13:56

## 2025-01-30 RX ADMIN — Medication 400 MILLIGRAM(S): at 11:06
